# Patient Record
Sex: MALE | Race: BLACK OR AFRICAN AMERICAN | Employment: FULL TIME | URBAN - METROPOLITAN AREA
[De-identification: names, ages, dates, MRNs, and addresses within clinical notes are randomized per-mention and may not be internally consistent; named-entity substitution may affect disease eponyms.]

---

## 2019-05-13 ENCOUNTER — APPOINTMENT (OUTPATIENT)
Dept: CT IMAGING | Age: 39
End: 2019-05-13
Payer: MEDICAID

## 2019-05-13 ENCOUNTER — APPOINTMENT (OUTPATIENT)
Dept: GENERAL RADIOLOGY | Age: 39
End: 2019-05-13
Payer: MEDICAID

## 2019-05-13 ENCOUNTER — HOSPITAL ENCOUNTER (EMERGENCY)
Age: 39
Discharge: HOME | End: 2019-05-13
Attending: EMERGENCY MEDICINE
Payer: MEDICAID

## 2019-05-13 VITALS
HEART RATE: 59 BPM | SYSTOLIC BLOOD PRESSURE: 140 MMHG | RESPIRATION RATE: 14 BRPM | BODY MASS INDEX: 25.6 KG/M2 | HEIGHT: 72 IN | WEIGHT: 189 LBS | DIASTOLIC BLOOD PRESSURE: 88 MMHG | OXYGEN SATURATION: 98 % | TEMPERATURE: 98.7 F

## 2019-05-13 DIAGNOSIS — S52.601A CLOSED FRACTURE OF DISTAL ENDS OF RIGHT RADIUS AND ULNA, INITIAL ENCOUNTER: ICD-10-CM

## 2019-05-13 DIAGNOSIS — R91.1 PULMONARY NODULE: Primary | ICD-10-CM

## 2019-05-13 DIAGNOSIS — S52.501A CLOSED FRACTURE OF DISTAL ENDS OF RIGHT RADIUS AND ULNA, INITIAL ENCOUNTER: ICD-10-CM

## 2019-05-13 LAB
ABSOLUTE EOS #: 0.16 K/UL (ref 0–0.44)
ABSOLUTE IMMATURE GRANULOCYTE: <0.03 K/UL (ref 0–0.3)
ABSOLUTE LYMPH #: 2.09 K/UL (ref 1.1–3.7)
ABSOLUTE MONO #: 0.28 K/UL (ref 0.1–1.2)
ALBUMIN SERPL-MCNC: 4.6 G/DL (ref 3.5–5.2)
ALBUMIN/GLOBULIN RATIO: 1.4 (ref 1–2.5)
ALP BLD-CCNC: 65 U/L (ref 40–129)
ALT SERPL-CCNC: 13 U/L (ref 5–41)
ANION GAP SERPL CALCULATED.3IONS-SCNC: 11 MMOL/L (ref 9–17)
AST SERPL-CCNC: 18 U/L
BASOPHILS # BLD: 1 % (ref 0–2)
BASOPHILS ABSOLUTE: 0.04 K/UL (ref 0–0.2)
BILIRUB SERPL-MCNC: 0.34 MG/DL (ref 0.3–1.2)
BUN BLDV-MCNC: 18 MG/DL (ref 6–20)
BUN/CREAT BLD: ABNORMAL (ref 9–20)
CALCIUM SERPL-MCNC: 9.2 MG/DL (ref 8.6–10.4)
CHLORIDE BLD-SCNC: 104 MMOL/L (ref 98–107)
CO2: 23 MMOL/L (ref 20–31)
CREAT SERPL-MCNC: 1.21 MG/DL (ref 0.7–1.2)
DIFFERENTIAL TYPE: ABNORMAL
EOSINOPHILS RELATIVE PERCENT: 4 % (ref 1–4)
GFR AFRICAN AMERICAN: >60 ML/MIN
GFR NON-AFRICAN AMERICAN: >60 ML/MIN
GFR SERPL CREATININE-BSD FRML MDRD: ABNORMAL ML/MIN/{1.73_M2}
GFR SERPL CREATININE-BSD FRML MDRD: ABNORMAL ML/MIN/{1.73_M2}
GLUCOSE BLD-MCNC: 95 MG/DL (ref 70–99)
HCT VFR BLD CALC: 41.5 % (ref 40.7–50.3)
HEMOGLOBIN: 12.6 G/DL (ref 13–17)
IMMATURE GRANULOCYTES: 0 %
INR BLD: 1
LYMPHOCYTES # BLD: 46 % (ref 24–43)
MCH RBC QN AUTO: 26.4 PG (ref 25.2–33.5)
MCHC RBC AUTO-ENTMCNC: 30.4 G/DL (ref 28.4–34.8)
MCV RBC AUTO: 86.8 FL (ref 82.6–102.9)
MONOCYTES # BLD: 6 % (ref 3–12)
NRBC AUTOMATED: 0 PER 100 WBC
PARTIAL THROMBOPLASTIN TIME: 24.7 SEC (ref 20.5–30.5)
PDW BLD-RTO: 15.1 % (ref 11.8–14.4)
PLATELET # BLD: 205 K/UL (ref 138–453)
PLATELET ESTIMATE: ABNORMAL
PMV BLD AUTO: 12.4 FL (ref 8.1–13.5)
POTASSIUM SERPL-SCNC: 3.4 MMOL/L (ref 3.7–5.3)
PROTHROMBIN TIME: 10.7 SEC (ref 9–12)
RBC # BLD: 4.78 M/UL (ref 4.21–5.77)
RBC # BLD: ABNORMAL 10*6/UL
SEG NEUTROPHILS: 43 % (ref 36–65)
SEGMENTED NEUTROPHILS ABSOLUTE COUNT: 1.92 K/UL (ref 1.5–8.1)
SODIUM BLD-SCNC: 138 MMOL/L (ref 135–144)
TOTAL PROTEIN: 7.8 G/DL (ref 6.4–8.3)
WBC # BLD: 4.5 K/UL (ref 3.5–11.3)
WBC # BLD: ABNORMAL 10*3/UL

## 2019-05-13 PROCEDURE — 72128 CT CHEST SPINE W/O DYE: CPT

## 2019-05-13 PROCEDURE — 72170 X-RAY EXAM OF PELVIS: CPT

## 2019-05-13 PROCEDURE — 96374 THER/PROPH/DIAG INJ IV PUSH: CPT

## 2019-05-13 PROCEDURE — 73100 X-RAY EXAM OF WRIST: CPT

## 2019-05-13 PROCEDURE — 6360000004 HC RX CONTRAST MEDICATION: Performed by: EMERGENCY MEDICINE

## 2019-05-13 PROCEDURE — 71260 CT THORAX DX C+: CPT

## 2019-05-13 PROCEDURE — 2500000003 HC RX 250 WO HCPCS: Performed by: STUDENT IN AN ORGANIZED HEALTH CARE EDUCATION/TRAINING PROGRAM

## 2019-05-13 PROCEDURE — 72125 CT NECK SPINE W/O DYE: CPT

## 2019-05-13 PROCEDURE — 73562 X-RAY EXAM OF KNEE 3: CPT

## 2019-05-13 PROCEDURE — 73110 X-RAY EXAM OF WRIST: CPT

## 2019-05-13 PROCEDURE — 70450 CT HEAD/BRAIN W/O DYE: CPT

## 2019-05-13 PROCEDURE — 85730 THROMBOPLASTIN TIME PARTIAL: CPT

## 2019-05-13 PROCEDURE — 6370000000 HC RX 637 (ALT 250 FOR IP): Performed by: EMERGENCY MEDICINE

## 2019-05-13 PROCEDURE — 73130 X-RAY EXAM OF HAND: CPT

## 2019-05-13 PROCEDURE — 99285 EMERGENCY DEPT VISIT HI MDM: CPT

## 2019-05-13 PROCEDURE — 85610 PROTHROMBIN TIME: CPT

## 2019-05-13 PROCEDURE — 73120 X-RAY EXAM OF HAND: CPT

## 2019-05-13 PROCEDURE — 85025 COMPLETE CBC W/AUTO DIFF WBC: CPT

## 2019-05-13 PROCEDURE — 73090 X-RAY EXAM OF FOREARM: CPT

## 2019-05-13 PROCEDURE — 80053 COMPREHEN METABOLIC PANEL: CPT

## 2019-05-13 PROCEDURE — 6360000002 HC RX W HCPCS: Performed by: EMERGENCY MEDICINE

## 2019-05-13 PROCEDURE — 25605 CLTX DST RDL FX/EPHYS SEP W/: CPT

## 2019-05-13 PROCEDURE — 72131 CT LUMBAR SPINE W/O DYE: CPT

## 2019-05-13 RX ORDER — LIDOCAINE HYDROCHLORIDE 10 MG/ML
20 INJECTION, SOLUTION INFILTRATION; PERINEURAL ONCE
Status: COMPLETED | OUTPATIENT
Start: 2019-05-13 | End: 2019-05-13

## 2019-05-13 RX ORDER — FENTANYL CITRATE 50 UG/ML
50 INJECTION, SOLUTION INTRAMUSCULAR; INTRAVENOUS ONCE
Status: COMPLETED | OUTPATIENT
Start: 2019-05-13 | End: 2019-05-13

## 2019-05-13 RX ORDER — OXYCODONE HYDROCHLORIDE AND ACETAMINOPHEN 5; 325 MG/1; MG/1
1 TABLET ORAL EVERY 8 HOURS PRN
Qty: 20 TABLET | Refills: 0 | Status: SHIPPED | OUTPATIENT
Start: 2019-05-13 | End: 2019-05-20

## 2019-05-13 RX ORDER — OXYCODONE HYDROCHLORIDE AND ACETAMINOPHEN 5; 325 MG/1; MG/1
2 TABLET ORAL ONCE
Status: COMPLETED | OUTPATIENT
Start: 2019-05-13 | End: 2019-05-13

## 2019-05-13 RX ORDER — NAPROXEN 500 MG/1
500 TABLET ORAL 2 TIMES DAILY WITH MEALS
Qty: 60 TABLET | Refills: 0 | Status: SHIPPED | OUTPATIENT
Start: 2019-05-13 | End: 2019-05-28

## 2019-05-13 RX ADMIN — FENTANYL CITRATE 50 MCG: 50 INJECTION, SOLUTION INTRAMUSCULAR; INTRAVENOUS at 18:07

## 2019-05-13 RX ADMIN — OXYCODONE HYDROCHLORIDE AND ACETAMINOPHEN 2 TABLET: 5; 325 TABLET ORAL at 20:02

## 2019-05-13 RX ADMIN — LIDOCAINE HYDROCHLORIDE 20 ML: 10 INJECTION, SOLUTION INFILTRATION; PERINEURAL at 20:03

## 2019-05-13 RX ADMIN — IOHEXOL 75 ML: 350 INJECTION, SOLUTION INTRAVENOUS at 18:57

## 2019-05-13 ASSESSMENT — PAIN SCALES - GENERAL
PAINLEVEL_OUTOF10: 10
PAINLEVEL_OUTOF10: 3
PAINLEVEL_OUTOF10: 10

## 2019-05-13 ASSESSMENT — ENCOUNTER SYMPTOMS
SHORTNESS OF BREATH: 0
ABDOMINAL PAIN: 1
BACK PAIN: 0
CHEST TIGHTNESS: 0
DIARRHEA: 0
COLOR CHANGE: 0
VOMITING: 0
NAUSEA: 0

## 2019-05-13 ASSESSMENT — PAIN DESCRIPTION - PAIN TYPE: TYPE: ACUTE PAIN

## 2019-05-13 ASSESSMENT — PAIN DESCRIPTION - PROGRESSION: CLINICAL_PROGRESSION: NOT CHANGED

## 2019-05-13 NOTE — CONSULTS
Juan Horn      CC/Reason for consult:  Fall off scaffolding 12' - right wrist pain     HPI:      The patient is a LHD 42 y.o. male who presents to the ED after sustaining a fall of scaffolding. Patient states that he was about 15' in the air and the scaffolding came loose causing him to loose his balance and fall to the ground. He fell onto an outstretched right arm. Patient admits he does not recall the entire even and believes he had struck his head. He is complaining of acute pain to the right wrist as well as right knee pain. He admits to prior wrist injury many years ago which he states was treated in a cast however he did not have any follow up. Patient denies any prior surgeries. Patient denies any numbness or tingling but notes some dysesthesias to the tip of the distal phalanx of the thumb. Past Medical History:    History reviewed. No pertinent past medical history. Past Surgical History:    History reviewed. No pertinent surgical history. Medications Prior to Admission:   Prior to Admission medications    Not on File       Allergies:    Patient has no known allergies.     Social History:   Social History     Socioeconomic History    Marital status: None     Spouse name: None    Number of children: None    Years of education: None    Highest education level: None   Occupational History    None   Social Needs    Financial resource strain: None    Food insecurity:     Worry: None     Inability: None    Transportation needs:     Medical: None     Non-medical: None   Tobacco Use    Smoking status: Never Smoker    Smokeless tobacco: Never Used   Substance and Sexual Activity    Alcohol use: Yes     Comment: socially    Drug use: Yes     Types: Marijuana    Sexual activity: None   Lifestyle    Physical activity:     Days per week: None     Minutes per session: None    Stress: None   Relationships    Social connections:     Talks on phone: None Gets together: None     Attends Confucianist service: None     Active member of club or organization: None     Attends meetings of clubs or organizations: None     Relationship status: None    Intimate partner violence:     Fear of current or ex partner: None     Emotionally abused: None     Physically abused: None     Forced sexual activity: None   Other Topics Concern    None   Social History Narrative    None       Family History:  History reviewed. No pertinent family history. REVIEW OF SYSTEMS:   Review of Systems   Constitutional: Negative for fever and chills. HENT: Negative for congestion. Eyes: Negative for blurred vision and double vision. Respiratory: Negative for cough, shortness of breath and wheezing. Cardiovascular: Negative for chest pain and palpitations. Gastrointestinal: Negative for nausea. Negative for vomiting. Musculoskeletal: Positive for right wrist pain  Skin: Negative for itching and rash. Neurological: Negative for dizziness, sensory change and headaches. Psychiatric/Behavioral: Negative for depression and suicidal ideas. PHYSICAL EXAM:  Blood pressure (!) 132/93, pulse 62, temperature 97.8 °F (36.6 °C), temperature source Oral, resp. rate 16, height 6' (1.829 m), weight 189 lb (85.7 kg), SpO2 95 %. Gen: alert and oriented, NAD, cooperative  Head: normocephalic atraumatic   Neck: supple  Chest: Symmetric chest excursion, non labored breathing. B/l clavicles without TTP, crepitus, step off, or deformity  Heart: Regular rate, no edema, distal pulses 2+   Abdomen: non distended  Pelvis: stable to anterior and lateral compression   RUE: No ecchymoses, abrasion,  erythema or lacerations. Deformity noted to the right wrist. Skin intact. No TTP or crepitus to shoulder, arm, elbow, or hand. TTP over the distal aspect of radius and ulna. Full active and passive ROM shoulder and elbow met without pain. Compartments soft and compressible. Hand is warm and perfused. Axillary/MSC/Ulnar/Median/AIN/PIN motor intact. C4-T1 SILT. Radial pulse 2+ with BCR. Mild dysesthesias to the tip of the thumb distal to the IP joint. LUE: No ecchymoses, abrasion, deformity, erythema or lacerations. Skin intact. No TTP or crepitus to shoulder, arm, elbow, forearm, wrist, or hand. Full active and passive ROM met without pain. Compartments soft and compressible. Hand is warm and perfused. Axillary/MSC/Ulnar/Median/AIN/PIN motor intact. C4-T1 SILT. Radial pulse 2+ with BCR  RLE: No ecchymoses, abrasions, deformity, erythema or lacerations. Skin intact. No TTP or crepitus to hip, tibia/fibula, ankle or foot. Mild TTP to the lateral aspect of knee/distal femur. Full active and passive ROM met with minimal pain. Negative log roll. Negative Stinchfield. Compartments soft and compressible. Foot is warm and perfused. EHL/FHL/TA/GS complex motor intact. Sural, saphenous, superificial/deep peroneal, and plantar nerve distribution SILT. Dorsalis pedis/posterior tibial pulses 2+ with BCR. Knee ligaments grossly intact. LLE: No ecchymoses, abrasions, deformity, erythema or lacerations. Skin intact. No TTP or crepitus to hip, femur, knee, tibia/fibula, ankle or foot. Full active and passive ROM met without pain. Negative log roll. Negative Stinchfield. Compartments soft and compressible. Foot is warm and perfused. EHL/FHL/TA/GS complex motor intact. Sural, saphenous, superificial/deep peroneal, and plantar nerve distribution SILT. Dorsalis pedis/posterior tibial pulses 2+ with BCR. Knee ligaments grossly intact. LABS:  Recent Labs     05/13/19  1805   WBC 4.5   HGB 12.6*   HCT 41.5      INR 1.0      K 3.4*   BUN 18   CREATININE 1.21*   GLUCOSE 95        Radiology:   AP/Lateral/Oblique of a right wrist demonstrating a comminuted impacted distal radius fracture with significant loss of radial height and inclination. Chronic scaphoid fracture with nonunion noted.   Degenerative changes throughout the midcarpal joint     A/P: 42 y.o. male being seen for right distal radius fracture    -Closed reduction and sugar tong splint application under hematoma block performed in ED. Patient tolerated well. See procedure note for details.  - ZARI TSAI  - Pain control and medical management ED  - Maintain splint at all times. - discussed proper splint care with the patient. - Discussed signs and symptoms of compartment syndrome. - Ice (20 minutes on and off 1 hour) and elevate (above heart) as needed for swelling/pain  - Will discuss case with Dr. Kevin Márquez to [place] from orthopedic perspective. Follow up with Dr. Kolton Higginbotham in 1 week. - Please page DO Ortho with any questions or concerns    Ac Keen DO  7:12 PM 5/13/2019    Hematoma block: Risks, benefits, and alternatives were discussed with the patient, and verbal consent was obtained for hematoma block. 10cc of 1% plain lidocaine was injected into the right distal radius fracture. Once adequate pain control had been achieved, reduction maneuver was performed and fragment position was confirmed on X-ray. A sugar tong splint was applied, and fragment position was again confirmed on X-ray. The patient noticed decrease in pain and denied numbness or tingling in his fingers.

## 2019-05-13 NOTE — ED NOTES
Initial contact with patient;  Pt returned from [address] [place] [address]  05/13/19 4709

## 2019-05-13 NOTE — ED NOTES
Bed: 30  Expected date:   Expected time:   Means of arrival:   Comments:  [id number], PETER  05/13/19 9934

## 2019-05-13 NOTE — H&P
TRAUMA HISTORY AND PHYSICAL EXAMINATION    PATIENT NAME: Joycelyn Lopez  YOB: 1980  MEDICAL RECORD NO. [id number]   DATE: 5/13/2019  PRIMARY CARE PHYSICIAN: No primary care provider on file. PATIENT EVALUATED AT THE REQUEST OF : Trevor Lopez    ACTIVATION   []Trauma Alert     [] Trauma Priority     [x]Trauma Consult. IMPRESSION:      comminuted impacted intraarticular fx distal radius with displaced fx ulnar styloid     MEDICAL DECISION MAKING AND PLAN:     Right two bone fx resulting in significant distracting injury therefore will perform a CT head c/t/l chest abd pelvis. Recommned pain control and reduction of fx. [address]    [] Neurosurgery     [x] Orthopedic Surgery    [] Cardiothoracic     [] Facial Trauma    [] Plastic Surgery (Burn)    [] Pediatric Surgery     [] Internal Medicine     [] Pulmonary Medicine    [] Other:        HISTORY:     SOURCE OF INFORMATION  Patient information was obtained from patient. History/Exam limitations: none. INJURY SUMMARY  Extremity -  fracture; closed and radius Right, ulna Right    GENERAL DATA  Age 42 y.o.  male   Patient information was obtained from patient. History/Exam limitations: none.   Patient presented to the Emergency Department by ambulance where the patient received see Ambulance Run Sheet prior to arrival.  Injury Date: 5/13/2019   Approximate Injury Time: 17:00        Transport mode:   [x]Ambulance      [] Helicopter     []Car       [] Other  Referring Hospital:     [address], (e.g., home, farm, industry, street)  Specific Details of Location (e.g., bedroom, kitchen, garage): work  Type of Residence (if occurred in home setting) (e.g., apartment, mobile home, single family home): unknown     MECHANISM OF INJURY    [] Motor Vehicle Collision   Specific vehicle type involved (e.g., sedan, minivan, SUV, pickup truck):   Collision with (e.g., type of vehicle, building, barn, ditch, tree):     Type of collision  [] Single Vehicle Collision  []Multiple Vehicle Collision  [] unknown collision type    Mechanism considerations  [] Fatality in Same Vehicle      []Ejected       []Rollover          []Extricated    Internal Compartment   []                      []Passenger:      []Front Seat        []Rear [address]  [] Unrestrained   []Lap Belt Only Restrained   [] Shoulder Belt Only Restrained  [] 3 Point Restrained  [] unknown     Air Bags  [] Front Air Bag  []Side Air Bag  []Curtain Airbag [][employer] Not Deployed        Pediatric Consideration:      [] Booster Seat  []Infant Car Seat  [] Child Car Seat      [] Motorcycle Collision   Wearing Helmet     []Yes     []No    []Unknown    [] Bicycle Collision Wearing Helmet     []Yes     []No    []Unknown     [] Pedestrian Struck         [x] Fall    []From Standing     []From Height 12-16 Ft     []Down Stairs ___steps    [] Assault    [] Gunshot  Specify caliber / type of gun: ____________________________    [] [address] weapon type, size: _____________________________    [] Burn  []Flame   []Scald   []Electrical   []Chemical  []Inhalation   []House fire    [] Other ______________________________________________________    [] Other protective devices used / worn ___________________________    HISTORY:     Aissatou Owusu is a 42 y.o. male that presented to the Emergency Department following fall off scaffolding ladder of approximately 12 - 16 ft. Ladder was not opened all the way and became wobbly. Patient fell to right side landing on right forearm. He \"blacked out\" for a few seconds and it took him a little to catch his breath and standup. He was ambulatory but had immediate right forearm and left flank pain. Denies n/v, vision / taste / hearing  Changes or altered sensorium. No dysthesia. No difficulty swallowing, speaking, or breathing. No headache, neck pain, or back pain. No leg or foot pain. No Chest pain or seizure like activity. No AC. Not amnestic. Loss of Consciousness []No   []Yes Duration(min)       [x] Unknown     Total Fluids Given Prior To Arrival  cc    MEDICATIONS:   []  None     []  Information not available due to exam limitations documented above  Prior to Admission medications    Medication Sig Start Date End Date Taking? Authorizing Provider   oxyCODONE-acetaminophen (PERCOCET) 5-325 MG per tablet Take 1 tablet by mouth every 8 hours as needed for Pain for up to 7 days. Intended supply: 5 days. Take lowest dose possible to manage pain 5/13/19 5/20/19 Yes Gerber White MD   naproxen (NAPROSYN) 500 MG tablet Take 1 tablet by mouth 2 times daily (with meals) for 30 doses 5/13/19 5/28/19 Yes Gerber White MD       ALLERGIES:   []  None    []   Information not available due to exam limitations documented above   Patient has no known allergies. PAST MEDICAL HISTORY: []  None   []   Information not available due to exam limitations documented above    has no past medical history on file. has no past surgical history on file. FAMILY HISTORY   []   Information not available due to exam limitations documented above    family history is not on file. SOCIAL HISTORY  []   Information not available due to exam limitations documented above     reports that he has never smoked. He has never used smokeless tobacco.   reports that he drinks alcohol. reports that he has current or past drug history. Drug: Marijuana. PERTINENT SYSTEMIC REVIEW:    []   Information not available due to exam limitations documented above    Pertinent items are noted in HPI.     PHYSICAL EXAMINATION:     GLASCOW COMA SCALE  NEUROMUSCULAR BLOCKADE PRIOR TO ARRIVAL     [x]No        []Yes      Variable  Score   Variable  Score  Eye opening [x]Spontaneous 4 Verbal  [x]Oriented  5     []To voice  3   []Confused  4    []To pain  2   []Inapp words  3    []None  1   []Incomp words 2       []None  1   Motor   [x]Obeys  6    []Localizes the distal radius with a mildly displaced fracture of the ulnar styloid. Pelvic x-ray: No acute fracture or hip dislocation is identified. Acute fracture of the right wrist. Additional incidental findings as detailed above. Xr Wrist Right (2 Views)    Result Date: 5/13/2019  EXAMINATION: 2 XRAY VIEWS OF THE RIGHT WRIST 5/13/2019 8:24 pm COMPARISON: None. HISTORY: ORDERING SYSTEM PROVIDED HISTORY: Post-Reduction TECHNOLOGIST PROVIDED HISTORY: Post-Reduction Ordering Physician Provided Reason for Exam: post reduction right wrist Acuity: Acute Type of Exam: Subsequent/Follow-up FINDINGS: Comminuted intra-articular impacted fracture distal radius. Avulsion fracture ulnar styloid. Cystic changes within the proximal row of carpal bones. Alignment anatomic. Soft tissue swelling. Subcutaneous gas possibly dorsally. Fracture distal radius and ulna     Xr Wrist Right (min 3 Views)    Result Date: 5/13/2019  EXAMINATION: 3 X-RAY VIEWS OF THE RIGHT WRIST; 3 XRAY VIEWS OF THE RIGHT KNEE 5/13/2019 8:26 pm; 5/13/2019 8:28 pm COMPARISON: Right wrist radiographs May 13, 2019 20:03 HISTORY: ORDERING SYSTEM PROVIDED HISTORY: Post-Splint TECHNOLOGIST PROVIDED HISTORY: Post-Splint Ordering Physician Provided Reason for Exam: Post casting hx of fall Acuity: Acute Type of Exam: Ongoing; ORDERING SYSTEM PROVIDED HISTORY: Fall TECHNOLOGIST PROVIDED HISTORY: include sunrise view Fall Ordering Physician Provided Reason for Exam: Hx of fall right knee pain Acuity: Acute Type of Exam: Ongoing FINDINGS: Right wrist: Interval reduction with overlying splinting material surrounding bony detail. Comminuted distal radius fracture fragments are in improved alignment. No change in the ulnar styloid fracture. Right wrist degenerative changes. Right knee: No acute fracture. Joint spaces are preserved. No joint effusion. Right wrist reduction with improved alignment of fracture fragments.  No acute findings involving the right knee.     Xr Wrist Right (min 3 Views)    Result Date: 5/13/2019  EXAMINATION: 3 XRAY VIEWS OF THE RIGHT WRIST 5/13/2019 6:03 pm COMPARISON: None. HISTORY: ORDERING SYSTEM PROVIDED HISTORY: Concern for fracture TECHNOLOGIST PROVIDED HISTORY: Concern for fracture Ordering Physician Provided Reason for Exam: fell off ladder Acuity: Acute Type of Exam: Initial FINDINGS: There is a comminuted, impacted intra-articular fracture of the distal radius. There is also a mildly displaced fracture of the ulnar styloid. A scaphoid waist fracture is present, possibly chronic with nonunion of fracture fragments. There are nonspecific periarticular erosions of the proximal carpal row. Acute fracture of the wrist. Age-indeterminate but likely chronic fracture of the scaphoid. Nonspecific periarticular erosions of the proximal carpal row. Joint aspiration may be considered to rule out the possibility of infection. Xr Hand Right (min 3 Views)    Result Date: 5/13/2019  EXAMINATION: 3 XRAY VIEWS OF THE RIGHT HAND; AP AND LATERAL XRAY VIEWS OF THE RIGHT FOREARM; ONE XRAY VIEW OF THE PELVIS 5/13/2019 6:03 pm COMPARISON: None. HISTORY: ORDERING SYSTEM PROVIDED HISTORY: Concern for fracture TECHNOLOGIST PROVIDED HISTORY: Concern for fracture Ordering Physician Provided Reason for Exam: 2 Acuity: Acute Type of Exam: Initial; ORDERING SYSTEM PROVIDED HISTORY: Concern for fracture TECHNOLOGIST PROVIDED HISTORY: Concern for fracture Ordering Physician Provided Reason for Exam: pt fell Acuity: Acute Type of Exam: Initial; ORDERING SYSTEM PROVIDED HISTORY: Fall. TECHNOLOGIST PROVIDED HISTORY: Fall. Ordering Physician Provided Reason for Exam: pt fell onto left side Acuity: Acute Type of Exam: Initial FINDINGS: Right hand: There is a chronic appearing scaphoid waist fracture with nonunion of fracture fragments. There are nonspecific periarticular erosions of the proximal carpal row. An acute fracture of the wrist is present.  There may also be a chip fracture of the lunate. Right forearm: No fracture of the proximal forearm is identified. There is a comminuted, impacted intra-articular fracture of the distal radius with a mildly displaced fracture of the ulnar styloid. Pelvic x-ray: No acute fracture or hip dislocation is identified. Acute fracture of the right wrist. Additional incidental findings as detailed above. Xr Knee Right (3 Views)    Result Date: 5/13/2019  EXAMINATION: 3 X-RAY VIEWS OF THE RIGHT WRIST; 3 XRAY VIEWS OF THE RIGHT KNEE 5/13/2019 8:26 pm; 5/13/2019 8:28 pm COMPARISON: Right wrist radiographs May 13, 2019 20:03 HISTORY: ORDERING SYSTEM PROVIDED HISTORY: Post-Splint TECHNOLOGIST PROVIDED HISTORY: Post-Splint Ordering Physician Provided Reason for Exam: Post casting hx of fall Acuity: Acute Type of Exam: Ongoing; ORDERING SYSTEM PROVIDED HISTORY: Fall TECHNOLOGIST PROVIDED HISTORY: include sunrise view Fall Ordering Physician Provided Reason for Exam: Hx of fall right knee pain Acuity: Acute Type of Exam: Ongoing FINDINGS: Right wrist: Interval reduction with overlying splinting material surrounding bony detail. Comminuted distal radius fracture fragments are in improved alignment. No change in the ulnar styloid fracture. Right wrist degenerative changes. Right knee: No acute fracture. Joint spaces are preserved. No joint effusion. Right wrist reduction with improved alignment of fracture fragments. No acute findings involving the right knee. Ct Head Wo Contrast    Result Date: 5/13/2019  EXAMINATION: CT OF THE HEAD WITHOUT CONTRAST  5/13/2019 5:59 pm TECHNIQUE: CT of the head was performed without the administration of intravenous contrast. Dose modulation, iterative reconstruction, and/or weight based adjustment of the mA/kV was utilized to reduce the radiation dose to as low as reasonably achievable. COMPARISON: None.  HISTORY: ORDERING SYSTEM PROVIDED HISTORY: + LOC, fall down 10-12 feet TECHNOLOGIST PROVIDED HISTORY: FINDINGS: BRAIN/VENTRICLES: There is no acute intracranial hemorrhage, mass effect or midline shift. No abnormal extra-axial fluid collection. The gray-white differentiation is maintained without evidence of an acute infarct. There is no evidence of hydrocephalus. ORBITS: The visualized portion of the orbits demonstrate no acute abnormality. SINUSES: The visualized paranasal sinuses and mastoid air cells demonstrate no acute abnormality. SOFT TISSUES/SKULL:  No acute abnormality of the visualized skull or soft tissues. No acute intracranial abnormality. Ct Cervical Spine Wo Contrast    Result Date: 5/13/2019  EXAMINATION: CT OF THE CERVICAL SPINE WITHOUT CONTRAST 5/13/2019 5:59 pm TECHNIQUE: CT of the cervical spine was performed without the administration of intravenous contrast. Multiplanar reformatted images are provided for review. Dose modulation, iterative reconstruction, and/or weight based adjustment of the mA/kV was utilized to reduce the radiation dose to as low as reasonably achievable. COMPARISON: None. HISTORY: ORDERING SYSTEM PROVIDED HISTORY: fall from scaffolding. TECHNOLOGIST PROVIDED HISTORY: Ordering Physician Provided Reason for Exam: fall Acuity: Acute Type of Exam: Initial FINDINGS: BONES/ALIGNMENT: There is no evidence of an acute cervical spine fracture. There is normal alignment of the cervical spine. DEGENERATIVE CHANGES: No significant degenerative changes. SOFT TISSUES: There is no prevertebral soft tissue swelling. No acute abnormality of the cervical spine. Ct Thoracic Spine Wo Contrast    Result Date: 5/13/2019  EXAMINATION: CT OF THE THORACIC SPINE WITHOUT CONTRAST; CT OF THE LUMBAR SPINE WITHOUT CONTRAST 5/13/2019 TECHNIQUE: CT of the thoracic spine was performed without the administration of intravenous contrast. Multiplanar reformatted images are provided for review.  Dose modulation, iterative reconstruction, and/or weight based adjustment of the mA/kV was utilized to reduce the radiation dose to as low as reasonably achievable.; CT of the lumbar spine was performed without the administration of intravenous contrast. Multiplanar reformatted images are provided for review. Dose modulation, iterative reconstruction, and/or weight based adjustment of the mA/kV was utilized to reduce the radiation dose to as low as reasonably achievable. COMPARISON: None. HISTORY: ORDERING SYSTEM PROVIDED HISTORY: fall > 12 ft TECHNOLOGIST PROVIDED HISTORY: Ordering Physician Provided Reason for Exam: fall Acuity: Acute Type of Exam: Initial; ORDERING SYSTEM PROVIDED HISTORY: fall TECHNOLOGIST PROVIDED HISTORY: fall FINDINGS: BONES/ALIGNMENT:  There is no gross evidence of an acute fracture of the thoracic or lumbar spine. There is normal alignment of the spine. DEGENERATIVE CHANGES: No significant degenerative changes of the thoracic or lumbar spine. SOFT TISSUES: No paraspinal mass is seen. No evidence of acute traumatic injury of the thoracic or lumbar spine. Ct Lumbar Spine Wo Contrast    Result Date: 5/13/2019  EXAMINATION: CT OF THE THORACIC SPINE WITHOUT CONTRAST; CT OF THE LUMBAR SPINE WITHOUT CONTRAST 5/13/2019 TECHNIQUE: CT of the thoracic spine was performed without the administration of intravenous contrast. Multiplanar reformatted images are provided for review. Dose modulation, iterative reconstruction, and/or weight based adjustment of the mA/kV was utilized to reduce the radiation dose to as low as reasonably achievable.; CT of the lumbar spine was performed without the administration of intravenous contrast. Multiplanar reformatted images are provided for review. Dose modulation, iterative reconstruction, and/or weight based adjustment of the mA/kV was utilized to reduce the radiation dose to as low as reasonably achievable. COMPARISON: None.  HISTORY: ORDERING SYSTEM PROVIDED HISTORY: fall > 12 ft TECHNOLOGIST PROVIDED HISTORY: Ordering Physician Provided Reason for Exam: fall Acuity: Acute Type of Exam: Initial; ORDERING SYSTEM PROVIDED HISTORY: fall TECHNOLOGIST PROVIDED HISTORY: fall FINDINGS: BONES/ALIGNMENT:  There is no gross evidence of an acute fracture of the thoracic or lumbar spine. There is normal alignment of the spine. DEGENERATIVE CHANGES: No significant degenerative changes of the thoracic or lumbar spine. SOFT TISSUES: No paraspinal mass is seen. No evidence of acute traumatic injury of the thoracic or lumbar spine. Ct Chest Abdomen Pelvis W Contrast    Result Date: 5/13/2019  EXAMINATION: CT OF THE CHEST, ABDOMEN, AND PELVIS WITH CONTRAST 5/13/2019 6:25 pm TECHNIQUE: CT of the chest, abdomen and pelvis was performed with the administration of intravenous contrast. Multiplanar reformatted images are provided for review. Dose modulation, iterative reconstruction, and/or weight based adjustment of the mA/kV was utilized to reduce the radiation dose to as low as reasonably achievable. COMPARISON: None HISTORY: ORDERING SYSTEM PROVIDED HISTORY: fall > 12 feet TECHNOLOGIST PROVIDED HISTORY: FINDINGS: Chest: Mediastinum:  Thoracic aorta and central portion of the pulmonary artery opacify normally. The ascending thoracic aorta is of normal caliber. Heart size is normal. There is no pericardial effusion. No mediastinal or hilar lymph nodes exceed the CT criteria for abnormal enlargement. Lungs/pleura: 5 mm pulmonary nodule left lower lobe on image number 83. lungs are otherwise clear. Soft Tissues/Bones: Normal Abdomen/Pelvis: Organs: The abdominal wall appears normal. The liver, spleen, pancreas, and adrenals appear normal.  Gallbladder normal. Kidneys appear normal. The bladder appears normal. GI/Bowel: The stomach,small bowel, and colon appear normal. Appendix normal. Pelvis: Normal Peritoneum/Retroperitoneum: The abdominal aorta and iliac arteries are normal in caliber. There is no pathologic adenopathy.  Bones/Soft Tissues: Normal     No acute traumatic sequelae. 5 mm left lower lobe pulmonary nodule. Recommend follow-up CT chest in 3-6 months. Differential considerations include infectious, inflammatory, and neoplastic etiologies. RADIOLOGY  PLAIN FILMS  Ordered Findings  [x]CHEST [x]Normal   [] Trauma Abnormality -    [x]PELVIS  []Normal   [] Trauma Abnormality -   EXTREMITIES      [x]RUE []Normal   [] Trauma Abnormality -  Right chronic scaphoid fx, possible R lunate chip fx, comminuted impacted intraarticular fx distal radius with displaced fx ulnar styloid     []LUE  []Normal   [] Trauma Abnormality -     []RLE []Normal   [] Trauma Abnormaiity -     []LLE  []Normal   [] Trauma Abnormaiity -   []OTHER []Normal   [] Trauma Abnormaiity -     CT SCANS  Ordered  [x]HEAD  []Normal   [] Trauma Abnormality -    [x]CHEST  []Normal   [] Trauma Abnormality -   [x]ABD/PELVIS[]Normal  [] Trauma Abnormality -   []FACE []Normal   [] Trauma Abnormality -   [x]C-SPINE  []Normal   [] Trauma Abnormality -   [x]T-SPINE  []Normal   [] Trauma Abnormality -   [x]L-SPINE  []Normal   [] Trauma Abnormality -     []ANGIOGRAPHY  []Normal     [] Trauma Abnormality -   []OTHER   []Normal     [] Trauma Abnormality -     LABS    Labs Reviewed   CBC WITH AUTO DIFFERENTIAL - Abnormal; Notable for the following components:       Result Value    Hemoglobin 12.6 (*)     RDW 15.1 (*)     Lymphocytes 46 (*)     All other components within normal limits   COMPREHENSIVE METABOLIC PANEL - Abnormal; Notable for the following components:    CREATININE 1.21 (*)     Potassium 3.4 (*)     All other components within normal limits   PROTIME-INR   APTT         Fred Tavares DO  5/13/19, 9:19 PM                   Trauma Attending Attestation      I have reviewed the above [facility] note(s) and confirmed the key elements of the medical history and physical exam. I have seen and examined the pt.   I have discussed the findings, established the care plan and recommendations with Resident, [facility] RN, bedside nurse.   Concussion with brief loss of consciousness  Right radial/ ulnar fracture   Tertiary exam       Muna Muller DO  5/14/2019  6:41 AM

## 2019-05-13 NOTE — ED PROVIDER NOTES
Relationships    Social connections:     Talks on phone: Not on file     Gets together: Not on file     Attends Gnosticist service: Not on file     Active member of club or organization: Not on file     Attends meetings of clubs or organizations: Not on file     Relationship status: Not on file    Intimate partner violence:     Fear of current or ex partner: Not on file     Emotionally abused: Not on file     Physically abused: Not on file     Forced sexual activity: Not on file   Other Topics Concern    Not on file   Social History Narrative    Not on file       History reviewed. No pertinent family history. Allergies:  Patient has no known allergies. Home Medications:  Prior to Admission medications    Medication Sig Start Date End Date Taking? Authorizing Provider   oxyCODONE-acetaminophen (PERCOCET) 5-325 MG per tablet Take 1 tablet by mouth every 8 hours as needed for Pain for up to 7 days. Intended supply: 5 days. Take lowest dose possible to manage pain 5/13/19 5/20/19 Yes Melody Aponte MD   naproxen (NAPROSYN) 500 MG tablet Take 1 tablet by mouth 2 times daily (with meals) for 30 doses 5/13/19 5/28/19 Yes Melody Aponte MD       REVIEW OF SYSTEMS    (2-9 systems for level 4, 10 or more for level 5)      Review of Systems   Constitutional: Negative for chills and fever. Respiratory: Negative for chest tightness and shortness of breath. Cardiovascular: Negative for chest pain. Gastrointestinal: Positive for abdominal pain. Negative for diarrhea, nausea and vomiting. Genitourinary: Negative for dysuria. Musculoskeletal: Positive for arthralgias. Negative for back pain and neck pain. Skin: Negative for color change. Neurological: Negative for weakness, numbness and headaches. Psychiatric/Behavioral: Negative for confusion.        PHYSICAL EXAM   (up to 7 for level 4, 8 or more for level 5)      INITIAL VITALS:   BP (!) 140/88   Pulse 59   Temp 98.7 °F (37.1 °C) (Oral)   Resp 14 Ht 6' (1.829 m)   Wt 189 lb (85.7 kg)   SpO2 98%   BMI 25.63 kg/m²     Physical Exam   Constitutional: He is oriented to person, place, and time. He appears well-developed and well-nourished. No distress. HENT:   Head: Normocephalic and atraumatic. Eyes: Conjunctivae and EOM are normal. Right eye exhibits no discharge. Left eye exhibits no discharge. Neck: Normal range of motion. No midline cervical tenderness on examination. Cardiovascular: Normal rate, regular rhythm and normal heart sounds. Exam reveals no gallop and no friction rub. No murmur heard. Pulmonary/Chest: Effort normal and breath sounds normal. No respiratory distress. He has no wheezes. He has no rales. Abdominal: Soft. He exhibits no distension. There is tenderness. There is no rebound and no guarding. Tenderness over the right upper quadrant over the liver. Musculoskeletal: He exhibits tenderness. He exhibits no edema. Significant tenderness over the right distal radius and ulna, neurovascularly intact, normal sensation, able to wiggle fingers. Decreased range of motion at the wrist secondary to pain. Bilateral radial pulses intact. No open skin wounds. Neurological: He is alert and oriented to person, place, and time. Skin: Skin is warm and dry. No erythema. Vitals reviewed.     No midline CTL tenderness on exam.    DIFFERENTIAL  DIAGNOSIS     PLAN (LABS / IMAGING / EKG):  Orders Placed This Encounter   Procedures    CT Head WO Contrast    CT Cervical Spine WO Contrast    XR CHEST STANDARD (2 VW)    XR PELVIS (1-2 VIEWS)    XR WRIST RIGHT (MIN 3 VIEWS)    XR RADIUS ULNA RIGHT (2 VIEWS)    XR HAND RIGHT (MIN 3 VIEWS)    CT CHEST ABDOMEN PELVIS W CONTRAST    CT THORACIC SPINE WO CONTRAST    CT LUMBAR SPINE WO CONTRAST    XR KNEE RIGHT (3 VIEWS)    XR WRIST RIGHT (MIN 3 VIEWS)    XR WRIST RIGHT (2 VIEWS)    CBC WITH AUTO DIFFERENTIAL    Protime-INR    APTT    Comprehensive Metabolic Panel    Inpatient consult to Trauma Surgery    Inpatient consult to Orthopedic Surgery       MEDICATIONS ORDERED:  Orders Placed This Encounter   Medications    fentaNYL (SUBLIMAZE) injection 50 mcg    iohexol (OMNIPAQUE 350) solution 75 mL    DISCONTD: iohexol (OMNIPAQUE 350) solution 75 mL    lidocaine 1 % injection 20 mL    oxyCODONE-acetaminophen (PERCOCET) 5-325 MG per tablet 2 tablet    oxyCODONE-acetaminophen (PERCOCET) 5-325 MG per tablet     Sig: Take 1 tablet by mouth every 8 hours as needed for Pain for up to 7 days. Intended supply: 5 days.  Take lowest dose possible to manage pain     Dispense:  20 tablet     Refill:  0    naproxen (NAPROSYN) 500 MG tablet     Sig: Take 1 tablet by mouth 2 times daily (with meals) for 30 doses     Dispense:  60 tablet     Refill:  0       DDX: Radial fracture versus ulnar fracture versus intracranial hemorrhage    DIAGNOSTIC RESULTS / EMERGENCY DEPARTMENT COURSE / MDM     LABS:  Results for orders placed or performed during the hospital encounter of 05/13/19   CBC WITH AUTO DIFFERENTIAL   Result Value Ref Range    WBC 4.5 3.5 - 11.3 k/uL    RBC 4.78 4.21 - 5.77 m/uL    Hemoglobin 12.6 (L) 13.0 - 17.0 g/dL    Hematocrit 41.5 40.7 - 50.3 %    MCV 86.8 82.6 - 102.9 fL    MCH 26.4 25.2 - 33.5 pg    MCHC 30.4 28.4 - 34.8 g/dL    RDW 15.1 (H) 11.8 - 14.4 %    Platelets 944 991 - 127 k/uL    MPV 12.4 8.1 - 13.5 fL    NRBC Automated 0.0 0.0 per 100 WBC    Differential Type NOT REPORTED     Seg Neutrophils 43 36 - 65 %    Lymphocytes 46 (H) 24 - 43 %    Monocytes 6 3 - 12 %    Eosinophils % 4 1 - 4 %    Basophils 1 0 - 2 %    Immature Granulocytes 0 0 %    Segs Absolute 1.92 1.50 - 8.10 k/uL    Absolute Lymph # 2.09 1.10 - 3.70 k/uL    Absolute Mono # 0.28 0.10 - 1.20 k/uL    Absolute Eos # 0.16 0.00 - 0.44 k/uL    Basophils # 0.04 0.00 - 0.20 k/uL    Absolute Immature Granulocyte <0.03 0.00 - 0.30 k/uL    WBC Morphology NOT REPORTED     RBC Morphology ANISOCYTOSIS PRESENT Platelet Estimate NOT REPORTED    Protime-INR   Result Value Ref Range    Protime 10.7 9.0 - 12.0 sec    INR 1.0    APTT   Result Value Ref Range    PTT 24.7 20.5 - 30.5 sec   Comprehensive Metabolic Panel   Result Value Ref Range    Glucose 95 70 - 99 mg/dL    BUN 18 6 - 20 mg/dL    CREATININE 1.21 (H) 0.70 - 1.20 mg/dL    Bun/Cre Ratio NOT REPORTED 9 - 20    Calcium 9.2 8.6 - 10.4 mg/dL    Sodium 138 135 - 144 mmol/L    Potassium 3.4 (L) 3.7 - 5.3 mmol/L    Chloride 104 98 - 107 mmol/L    CO2 23 20 - 31 mmol/L    Anion Gap 11 9 - 17 mmol/L    Alkaline Phosphatase 65 40 - 129 U/L    ALT 13 5 - 41 U/L    AST 18 <40 U/L    Total Bilirubin 0.34 0.3 - 1.2 mg/dL    Total Protein 7.8 6.4 - 8.3 g/dL    Alb 4.6 3.5 - 5.2 g/dL    Albumin/Globulin Ratio 1.4 1.0 - 2.5    GFR Non-African American >60 >60 mL/min    GFR African American >60 >60 mL/min    GFR Comment          GFR Staging NOT REPORTED        IMPRESSION: 46-year-old male comes to the emergency department secondary to a fall, fall was 10-12 feet, positive loss of consciousness, positive head trauma, significant right-sided wrist pain, appears to have a fracture, trauma consult was done, significant right upper quadrant tenderness over the liver due to mechanism of more than 10 feet, a CT abdomen and pelvis was done as well. Trauma consult, orthopedic surgery consult. RADIOLOGY:    Xr Pelvis (1-2 Views)    Result Date: 5/13/2019  EXAMINATION: 3 XRAY VIEWS OF THE RIGHT HAND; AP AND LATERAL XRAY VIEWS OF THE RIGHT FOREARM; ONE XRAY VIEW OF THE PELVIS 5/13/2019 6:03 pm COMPARISON: None.  HISTORY: ORDERING SYSTEM PROVIDED HISTORY: Concern for fracture TECHNOLOGIST PROVIDED HISTORY: Concern for fracture Ordering Physician Provided Reason for Exam: 2 Acuity: Acute Type of Exam: Initial; ORDERING SYSTEM PROVIDED HISTORY: Concern for fracture TECHNOLOGIST PROVIDED HISTORY: Concern for fracture Ordering Physician Provided Reason for Exam: pt fell Acuity: Acute Type of Exam: Initial; ORDERING SYSTEM PROVIDED HISTORY: Fall. TECHNOLOGIST PROVIDED HISTORY: Fall. Ordering Physician Provided Reason for Exam: pt fell onto left side Acuity: Acute Type of Exam: Initial FINDINGS: Right hand: There is a chronic appearing scaphoid waist fracture with nonunion of fracture fragments. There are nonspecific periarticular erosions of the proximal carpal row. An acute fracture of the wrist is present. There may also be a chip fracture of the lunate. Right forearm: No fracture of the proximal forearm is identified. There is a comminuted, impacted intra-articular fracture of the distal radius with a mildly displaced fracture of the ulnar styloid. Pelvic x-ray: No acute fracture or hip dislocation is identified. Acute fracture of the right wrist. Additional incidental findings as detailed above. Xr Radius Ulna Right (2 Views)    Result Date: 5/13/2019  EXAMINATION: 3 XRAY VIEWS OF THE RIGHT HAND; AP AND LATERAL XRAY VIEWS OF THE RIGHT FOREARM; ONE XRAY VIEW OF THE PELVIS 5/13/2019 6:03 pm COMPARISON: None. HISTORY: ORDERING SYSTEM PROVIDED HISTORY: Concern for fracture TECHNOLOGIST PROVIDED HISTORY: Concern for fracture Ordering Physician Provided Reason for Exam: 2 Acuity: Acute Type of Exam: Initial; ORDERING SYSTEM PROVIDED HISTORY: Concern for fracture TECHNOLOGIST PROVIDED HISTORY: Concern for fracture Ordering Physician Provided Reason for Exam: pt fell Acuity: Acute Type of Exam: Initial; ORDERING SYSTEM PROVIDED HISTORY: Fall. TECHNOLOGIST PROVIDED HISTORY: Fall. Ordering Physician Provided Reason for Exam: pt fell onto left side Acuity: Acute Type of Exam: Initial FINDINGS: Right hand: There is a chronic appearing scaphoid waist fracture with nonunion of fracture fragments. There are nonspecific periarticular erosions of the proximal carpal row. An acute fracture of the wrist is present. There may also be a chip fracture of the lunate.  Right forearm: No fracture of the proximal forearm is identified. There is a comminuted, impacted intra-articular fracture of the distal radius with a mildly displaced fracture of the ulnar styloid. Pelvic x-ray: No acute fracture or hip dislocation is identified. Acute fracture of the right wrist. Additional incidental findings as detailed above. Xr Wrist Right (2 Views)    Result Date: 5/13/2019  EXAMINATION: 2 XRAY VIEWS OF THE RIGHT WRIST 5/13/2019 8:24 pm COMPARISON: None. HISTORY: ORDERING SYSTEM PROVIDED HISTORY: Post-Reduction TECHNOLOGIST PROVIDED HISTORY: Post-Reduction Ordering Physician Provided Reason for Exam: post reduction right wrist Acuity: Acute Type of Exam: Subsequent/Follow-up FINDINGS: Comminuted intra-articular impacted fracture distal radius. Avulsion fracture ulnar styloid. Cystic changes within the proximal row of carpal bones. Alignment anatomic. Soft tissue swelling. Subcutaneous gas possibly dorsally. Fracture distal radius and ulna     Xr Wrist Right (min 3 Views)    Right wrist reduction with improved alignment of fracture fragments. No acute findings involving the right knee. Xr Wrist Right (min 3 Views)    Result Date: 5/13/2019  EXAMINATION: 3 XRAY VIEWS OF THE RIGHT WRIST 5/13/2019 6:03 pm COMPARISON: None. HISTORY: ORDERING SYSTEM PROVIDED HISTORY: Concern for fracture TECHNOLOGIST PROVIDED HISTORY: Concern for fracture Ordering Physician Provided Reason for Exam: fell off ladder Acuity: Acute Type of Exam: Initial FINDINGS: There is a comminuted, impacted intra-articular fracture of the distal radius. There is also a mildly displaced fracture of the ulnar styloid. A scaphoid waist fracture is present, possibly chronic with nonunion of fracture fragments. There are nonspecific periarticular erosions of the proximal carpal row. Acute fracture of the wrist. Age-indeterminate but likely chronic fracture of the scaphoid. Nonspecific periarticular erosions of the proximal carpal row.   Joint aspiration may be considered to rule out the possibility of infection. Xr Hand Right (min 3 Views)    Result Date: 5/13/2019  EXAMINATION: 3 XRAY VIEWS OF THE RIGHT HAND; AP AND LATERAL XRAY VIEWS OF THE RIGHT FOREARM; ONE XRAY VIEW OF THE PELVIS 5/13/2019 6:03 pm COMPARISON: None. HISTORY: ORDERING SYSTEM PROVIDED HISTORY: Concern for fracture TECHNOLOGIST PROVIDED HISTORY: Concern for fracture Ordering Physician Provided Reason for Exam: 2 Acuity: Acute Type of Exam: Initial; ORDERING SYSTEM PROVIDED HISTORY: Concern for fracture TECHNOLOGIST PROVIDED HISTORY: Concern for fracture Ordering Physician Provided Reason for Exam: pt fell Acuity: Acute Type of Exam: Initial; ORDERING SYSTEM PROVIDED HISTORY: Fall. TECHNOLOGIST PROVIDED HISTORY: Fall. Ordering Physician Provided Reason for Exam: pt fell onto left side Acuity: Acute Type of Exam: Initial FINDINGS: Right hand: There is a chronic appearing scaphoid waist fracture with nonunion of fracture fragments. There are nonspecific periarticular erosions of the proximal carpal row. An acute fracture of the wrist is present. There may also be a chip fracture of the lunate. Right forearm: No fracture of the proximal forearm is identified. There is a comminuted, impacted intra-articular fracture of the distal radius with a mildly displaced fracture of the ulnar styloid. Pelvic x-ray: No acute fracture or hip dislocation is identified. Acute fracture of the right wrist. Additional incidental findings as detailed above. Xr Knee Right (3 Views)    Right wrist reduction with improved alignment of fracture fragments. No acute findings involving the right knee.      Ct Head Wo Contrast    Result Date: 5/13/2019  EXAMINATION: CT OF THE HEAD WITHOUT CONTRAST  5/13/2019 5:59 pm TECHNIQUE: CT of the head was performed without the administration of intravenous contrast. Dose modulation, iterative reconstruction, and/or weight based adjustment of the mA/kV was utilized administration of intravenous contrast. Multiplanar reformatted images are provided for review. Dose modulation, iterative reconstruction, and/or weight based adjustment of the mA/kV was utilized to reduce the radiation dose to as low as reasonably achievable.; CT of the lumbar spine was performed without the administration of intravenous contrast. Multiplanar reformatted images are provided for review. Dose modulation, iterative reconstruction, and/or weight based adjustment of the mA/kV was utilized to reduce the radiation dose to as low as reasonably achievable. COMPARISON: None. HISTORY: ORDERING SYSTEM PROVIDED HISTORY: fall > 12 ft TECHNOLOGIST PROVIDED HISTORY: Ordering Physician Provided Reason for Exam: fall Acuity: Acute Type of Exam: Initial; ORDERING SYSTEM PROVIDED HISTORY: fall TECHNOLOGIST PROVIDED HISTORY: fall FINDINGS: BONES/ALIGNMENT:  There is no gross evidence of an acute fracture of the thoracic or lumbar spine. There is normal alignment of the spine. DEGENERATIVE CHANGES: No significant degenerative changes of the thoracic or lumbar spine. SOFT TISSUES: No paraspinal mass is seen. No evidence of acute traumatic injury of the thoracic or lumbar spine. Ct Lumbar Spine Wo Contrast    Result Date: 5/13/2019  EXAMINATION: CT OF THE THORACIC SPINE WITHOUT CONTRAST; CT OF THE LUMBAR SPINE WITHOUT CONTRAST 5/13/2019 TECHNIQUE: CT of the thoracic spine was performed without the administration of intravenous contrast. Multiplanar reformatted images are provided for review. Dose modulation, iterative reconstruction, and/or weight based adjustment of the mA/kV was utilized to reduce the radiation dose to as low as reasonably achievable.; CT of the lumbar spine was performed without the administration of intravenous contrast. Multiplanar reformatted images are provided for review.  Dose modulation, iterative reconstruction, and/or weight based adjustment of the mA/kV was utilized to reduce the radiation dose to as low as reasonably achievable. COMPARISON: None. HISTORY: ORDERING SYSTEM PROVIDED HISTORY: fall > 12 ft TECHNOLOGIST PROVIDED HISTORY: Ordering Physician Provided Reason for Exam: fall Acuity: Acute Type of Exam: Initial; ORDERING SYSTEM PROVIDED HISTORY: fall TECHNOLOGIST PROVIDED HISTORY: fall FINDINGS: BONES/ALIGNMENT:  There is no gross evidence of an acute fracture of the thoracic or lumbar spine. There is normal alignment of the spine. DEGENERATIVE CHANGES: No significant degenerative changes of the thoracic or lumbar spine. SOFT TISSUES: No paraspinal mass is seen. No evidence of acute traumatic injury of the thoracic or lumbar spine. Ct Chest Abdomen Pelvis W Contrast    Result Date: 5/13/2019  EXAMINATION: CT OF THE CHEST, ABDOMEN, AND PELVIS WITH CONTRAST 5/13/2019 6:25 pm TECHNIQUE: CT of the chest, abdomen and pelvis was performed with the administration of intravenous contrast. Multiplanar reformatted images are provided for review. Dose modulation, iterative reconstruction, and/or weight based adjustment of the mA/kV was utilized to reduce the radiation dose to as low as reasonably achievable. COMPARISON: None HISTORY: ORDERING SYSTEM PROVIDED HISTORY: fall > 12 feet TECHNOLOGIST PROVIDED HISTORY: FINDINGS: Chest: Mediastinum:  Thoracic aorta and central portion of the pulmonary artery opacify normally. The ascending thoracic aorta is of normal caliber. Heart size is normal. There is no pericardial effusion. No mediastinal or hilar lymph nodes exceed the CT criteria for abnormal enlargement. Lungs/pleura: 5 mm pulmonary nodule left lower lobe on image number [id number]. lungs are otherwise clear. Soft Tissues/Bones: Normal Abdomen/Pelvis: Organs: The abdominal wall appears normal. The liver, spleen, pancreas, and adrenals appear normal.  Gallbladder normal. Kidneys appear normal. The bladder appears normal. GI/Bowel:  The stomach,small bowel, and colon appear normal. Appendix normal. Pelvis: Normal Peritoneum/Retroperitoneum: The abdominal aorta and iliac arteries are normal in caliber. There is no pathologic adenopathy. Bones/Soft Tissues: Normal     No acute traumatic sequelae. 5 mm left lower lobe pulmonary nodule. Recommend follow-up CT chest in 3-6 months. Differential considerations include infectious, inflammatory, and neoplastic etiologies. EKG  None    All EKG's are interpreted by the Emergency Department Physician who either signs or Co-signs this chart in the absence of a cardiologist.    EMERGENCY DEPARTMENT COURSE:    Patient was splinted by orthopedic surgery, no further recommendations from orthopedic surgery, follow-up with Dr. Paz Mello in clinic in 1 week. Patient's C-spine was cleared by trauma surgery, patient is okay to be discharged per the trauma surgery team.  We will send the patient home with orthopedic surgery follow-up. Patient was updated regarding the pulmonary nodule, says that he has a history of smoking, says that he will follow up with a primary care physician and get the repeat imaging as needed for follow-up. PROCEDURES:  None    CONSULTS:  IP CONSULT TO TRAUMA SURGERY  IP CONSULT TO ORTHOPEDIC SURGERY    CRITICAL CARE:  None    FINAL IMPRESSION      1. Pulmonary nodule    2. Closed fracture of distal ends of right radius and ulna, initial encounter          DISPOSITION / PLAN     DISPOSITION        PATIENT REFERRED TO:  Cally Mike MD  [address] [place] [address]  [phone]    Call in 1 day  Call and set up appointment with orthopedic surgery      DISCHARGE MEDICATIONS:  Discharge Medication List as of 5/13/2019  8:32 PM      START taking these medications    Details   oxyCODONE-acetaminophen (PERCOCET) 5-325 MG per tablet Take 1 tablet by mouth every 8 hours as needed for Pain for up to 7 days. Intended supply: 5 days.  Take lowest dose possible to manage pain, Disp-20 tablet, R-0Print      naproxen (NAPROSYN) 500 MG tablet Take 1 tablet by mouth 2 times daily (with meals) for 30 doses, Disp-60 tablet, R-0Print             Ena Lima MD  Emergency Medicine Resident    (Please note that portions of thisnote were completed with a voice recognition program.  Efforts were made to edit the dictations but occasionally words are mis-transcribed.)       Ena Lima MD  05/13/19 [id number]

## 2019-05-14 NOTE — ED NOTES
Right arm splint applied by ortho.  Good cap refill and sensation to digits of right hand, Pt reports \"it feels a lot better\"     Balibna Mcclelland, RN  05/13/19 2032

## 2019-05-21 DIAGNOSIS — S69.91XA INJURY OF RIGHT WRIST, INITIAL ENCOUNTER: Primary | ICD-10-CM

## 2019-05-22 ENCOUNTER — OFFICE VISIT (OUTPATIENT)
Dept: ORTHOPEDIC SURGERY | Age: 39
End: 2019-05-22
Payer: MEDICAID

## 2019-05-22 VITALS — BODY MASS INDEX: 25.59 KG/M2 | HEIGHT: 72 IN | WEIGHT: 188.93 LBS

## 2019-05-22 DIAGNOSIS — S52.591A OTHER CLOSED FRACTURE OF DISTAL END OF RIGHT RADIUS, INITIAL ENCOUNTER: ICD-10-CM

## 2019-05-22 DIAGNOSIS — S69.91XA INJURY OF RIGHT WRIST, INITIAL ENCOUNTER: Primary | ICD-10-CM

## 2019-05-22 PROCEDURE — G8427 DOCREV CUR MEDS BY ELIG CLIN: HCPCS | Performed by: STUDENT IN AN ORGANIZED HEALTH CARE EDUCATION/TRAINING PROGRAM

## 2019-05-22 PROCEDURE — 99203 OFFICE O/P NEW LOW 30 MIN: CPT | Performed by: STUDENT IN AN ORGANIZED HEALTH CARE EDUCATION/TRAINING PROGRAM

## 2019-05-22 PROCEDURE — 1036F TOBACCO NON-USER: CPT | Performed by: STUDENT IN AN ORGANIZED HEALTH CARE EDUCATION/TRAINING PROGRAM

## 2019-05-22 PROCEDURE — G8419 CALC BMI OUT NRM PARAM NOF/U: HCPCS | Performed by: STUDENT IN AN ORGANIZED HEALTH CARE EDUCATION/TRAINING PROGRAM

## 2019-05-22 RX ORDER — TRAMADOL HYDROCHLORIDE 50 MG/1
50 TABLET ORAL EVERY 4 HOURS PRN
Qty: 30 TABLET | Refills: 0 | Status: SHIPPED | OUTPATIENT
Start: 2019-05-22 | End: 2019-05-27

## 2019-05-23 ENCOUNTER — TELEPHONE (OUTPATIENT)
Dept: ORTHOPEDIC SURGERY | Age: 39
End: 2019-05-23

## 2019-05-23 NOTE — TELEPHONE ENCOUNTER
Patient called stating that he needs a prior authorization for his pain medication (Tramadol). I have not received any faxes from his insurance or from his pharmacy. I did notify the patient he can \"self pay\" but he can not afford it. I will continue to watch for the request and have it done as promptly as possible.

## 2019-05-25 NOTE — PROGRESS NOTES
[address] [facility] [address]  Dept: [phone]    Ambulatory Orthopedic Office Visit      CHIEF COMPLAINT:    Chief Complaint   Patient presents with    Follow-up     ed f/u [facility] for displaced rt wrist fx        HISTORY OF PRESENT ILLNESS:    The patient is a left hand dominant 42 y.o.male who is being seen for evaluation of a right distal radius fracture. Patient sustained injury about 2 weeks ago on 5/13/19. Patient was working about 12 feet in the air on scaffolding which became loose and he fell to the ground. He fell onto his outstretched right arm and had immediate pain and tenderness to the right wrist. He presented to the emergency department where he was then found to have a distal radius fracture which was subsequently reduced and splinted. Patient presents today and his splint. He notes that he has maintained nonweightbearing status. He denies any numbness or tingling in his fingers. Past Medical History:    History reviewed. No pertinent past medical history. Past Surgical History:    History reviewed. No pertinent surgical history. Current Medications:   Current Outpatient Medications   Medication Sig Dispense Refill    traMADol (ULTRAM) 50 MG tablet Take 1 tablet by mouth every 4 hours as needed for Pain for up to 5 days. Intended supply: 5 days. Take lowest dose possible to manage pain 30 tablet 0    naproxen (NAPROSYN) 500 MG tablet Take 1 tablet by mouth 2 times daily (with meals) for 30 doses 60 tablet 0     No current facility-administered medications for this visit. Allergies:    Patient has no known allergies.     Social History:   Social History     Socioeconomic History    Marital status:      Spouse name: Not on file    Number of children: Not on file    Years of education: Not on file    Highest education level: Not on file   Occupational History    Not on file   Social Needs    Financial resource strain: Not on file    Food insecurity:     Worry: Not on file     Inability: Not on file    Transportation needs:     Medical: Not on file     Non-medical: Not on file   Tobacco Use    Smoking status: Never Smoker    Smokeless tobacco: Never Used   Substance and Sexual Activity    Alcohol use: Yes     Comment: socially    Drug use: Yes     Types: Marijuana    Sexual activity: Not on file   Lifestyle    Physical activity:     Days per week: Not on file     Minutes per session: Not on file    Stress: Not on file   Relationships    Social connections:     Talks on phone: Not on file     Gets together: Not on file     Attends Taoism service: Not on file     Active member of club or organization: Not on file     Attends meetings of clubs or organizations: Not on file     Relationship status: Not on file    Intimate partner violence:     Fear of current or ex partner: Not on file     Emotionally abused: Not on file     Physically abused: Not on file     Forced sexual activity: Not on file   Other Topics Concern    Not on file   Social History Narrative    Not on file       Family History:  History reviewed. No pertinent family history. REVIEW OF SYSTEMS:  Constitutional: Negative for fever and chills. HENT: Negative for congestion or drainage   Eyes: Negative for blurred vision and double vision. Respiratory: Negative for cough, shortnessof breath and wheezing. Cardiovascular: Negative for chest pain and palpitations. Gastrointestinal: Negative for nausea. Negative for vomiting. Musculoskeletal: Positive for myalgias and joint pain. Skin:Negative for itching and rash. Neurological: Negative for dizziness, sensory change and headaches. PHYSICAL EXAM:  Ht 6' 0.01\" (1.829 m)   Wt 188 lb 15 oz (85.7 kg)   BMI 25.62 kg/m²   Physical Exam  Gen: alert and oriented  Psych:  Appropriate affect; Appropriate knowledge base; Appropriate mood; No hallucinations;    Head: Dispense:  30 tablet     Refill:  0     Reduce doses taken as pain becomes manageable       No orders of the defined types were placed in this encounter. Reviewed Subjective section with patient who did agree and confirmed everything documented. Discussed plan and patient expressed understanding of diagnosis andprognosis with plan as stated. All questions answered.      Rey Aquino DO   Orthopedic Surgery Resident PGY-1  [address] [facility]

## 2019-05-29 ENCOUNTER — ANESTHESIA EVENT (OUTPATIENT)
Dept: OPERATING ROOM | Age: 39
End: 2019-05-29
Payer: MEDICAID

## 2019-05-30 ENCOUNTER — HOSPITAL ENCOUNTER (OUTPATIENT)
Age: 39
Setting detail: OUTPATIENT SURGERY
Discharge: HOME | End: 2019-05-30
Attending: ORTHOPAEDIC SURGERY | Admitting: ORTHOPAEDIC SURGERY
Payer: MEDICAID

## 2019-05-30 ENCOUNTER — APPOINTMENT (OUTPATIENT)
Dept: GENERAL RADIOLOGY | Age: 39
End: 2019-05-30
Attending: ORTHOPAEDIC SURGERY
Payer: MEDICAID

## 2019-05-30 ENCOUNTER — ANESTHESIA (OUTPATIENT)
Dept: OPERATING ROOM | Age: 39
End: 2019-05-30
Payer: MEDICAID

## 2019-05-30 VITALS — TEMPERATURE: 95.7 F | SYSTOLIC BLOOD PRESSURE: 99 MMHG | DIASTOLIC BLOOD PRESSURE: 53 MMHG | OXYGEN SATURATION: 100 %

## 2019-05-30 VITALS
OXYGEN SATURATION: 99 % | TEMPERATURE: 97.2 F | HEART RATE: 60 BPM | SYSTOLIC BLOOD PRESSURE: 125 MMHG | RESPIRATION RATE: 16 BRPM | HEIGHT: 72 IN | WEIGHT: 189.6 LBS | DIASTOLIC BLOOD PRESSURE: 75 MMHG | BODY MASS INDEX: 25.68 KG/M2

## 2019-05-30 DIAGNOSIS — G89.18 POST-OP PAIN: ICD-10-CM

## 2019-05-30 DIAGNOSIS — S52.571A OTHER CLOSED INTRA-ARTICULAR FRACTURE OF DISTAL END OF RIGHT RADIUS, INITIAL ENCOUNTER: Primary | ICD-10-CM

## 2019-05-30 PROCEDURE — 6360000002 HC RX W HCPCS: Performed by: ORTHOPAEDIC SURGERY

## 2019-05-30 PROCEDURE — C1713 ANCHOR/SCREW BN/BN,TIS/BN: HCPCS | Performed by: ORTHOPAEDIC SURGERY

## 2019-05-30 PROCEDURE — 2580000003 HC RX 258: Performed by: ANESTHESIOLOGY

## 2019-05-30 PROCEDURE — 2500000003 HC RX 250 WO HCPCS: Performed by: ANESTHESIOLOGY

## 2019-05-30 PROCEDURE — 73110 X-RAY EXAM OF WRIST: CPT

## 2019-05-30 PROCEDURE — 2709999900 HC NON-CHARGEABLE SUPPLY: Performed by: ORTHOPAEDIC SURGERY

## 2019-05-30 PROCEDURE — 7100000040 HC SPAR PHASE II RECOVERY - FIRST 15 MIN: Performed by: ORTHOPAEDIC SURGERY

## 2019-05-30 PROCEDURE — 3600000014 HC SURGERY LEVEL 4 ADDTL 15MIN: Performed by: ORTHOPAEDIC SURGERY

## 2019-05-30 PROCEDURE — 64415 NJX AA&/STRD BRCH PLXS IMG: CPT | Performed by: ANESTHESIOLOGY

## 2019-05-30 PROCEDURE — 7100000041 HC SPAR PHASE II RECOVERY - ADDTL 15 MIN: Performed by: ORTHOPAEDIC SURGERY

## 2019-05-30 PROCEDURE — 6360000002 HC RX W HCPCS: Performed by: NURSE ANESTHETIST, CERTIFIED REGISTERED

## 2019-05-30 PROCEDURE — 6360000002 HC RX W HCPCS: Performed by: ANESTHESIOLOGY

## 2019-05-30 PROCEDURE — 2720000010 HC SURG SUPPLY STERILE: Performed by: ORTHOPAEDIC SURGERY

## 2019-05-30 PROCEDURE — 2500000003 HC RX 250 WO HCPCS: Performed by: NURSE ANESTHETIST, CERTIFIED REGISTERED

## 2019-05-30 PROCEDURE — 7100000001 HC PACU RECOVERY - ADDTL 15 MIN: Performed by: ORTHOPAEDIC SURGERY

## 2019-05-30 PROCEDURE — 7100000000 HC PACU RECOVERY - FIRST 15 MIN: Performed by: ORTHOPAEDIC SURGERY

## 2019-05-30 PROCEDURE — 3700000001 HC ADD 15 MINUTES (ANESTHESIA): Performed by: ORTHOPAEDIC SURGERY

## 2019-05-30 PROCEDURE — 3700000000 HC ANESTHESIA ATTENDED CARE: Performed by: ORTHOPAEDIC SURGERY

## 2019-05-30 PROCEDURE — 3600000004 HC SURGERY LEVEL 4 BASE: Performed by: ORTHOPAEDIC SURGERY

## 2019-05-30 PROCEDURE — 25609 OPTX DST RD XART FX/EP SEP3+: CPT | Performed by: ORTHOPAEDIC SURGERY

## 2019-05-30 DEVICE — SCREW BNE L16MM DIA2.7MM CORT S STL ST T8 STARDRV RECESS: Type: IMPLANTABLE DEVICE | Site: WRIST | Status: FUNCTIONAL

## 2019-05-30 DEVICE — SCREW BNE L22MM DIA2.4MM DST RAD VOLAR S STL ST VAR ANG LOK: Type: IMPLANTABLE DEVICE | Site: WRIST | Status: FUNCTIONAL

## 2019-05-30 DEVICE — PLATE BNE L57MM 6X5 H NONSTERILE R DST RAD VOLAR RIM S STL: Type: IMPLANTABLE DEVICE | Site: WRIST | Status: FUNCTIONAL

## 2019-05-30 DEVICE — IMPLANTABLE DEVICE
Type: IMPLANTABLE DEVICE | Site: WRIST | Status: FUNCTIONAL
Brand: MICROAIRE®

## 2019-05-30 DEVICE — SCREW BNE L20MM DIA2.4MM DST RAD VOLAR S STL ST VAR ANG LOK: Type: IMPLANTABLE DEVICE | Site: WRIST | Status: FUNCTIONAL

## 2019-05-30 DEVICE — SCREW BNE L24MM DIA2.4MM CORT S STL ST T8 STARDRV RECESS: Type: IMPLANTABLE DEVICE | Site: WRIST | Status: FUNCTIONAL

## 2019-05-30 DEVICE — SCREW BNE L18MM DIA2.7MM CORT S STL ST T8 STARDRV RECESS: Type: IMPLANTABLE DEVICE | Site: WRIST | Status: FUNCTIONAL

## 2019-05-30 RX ORDER — GLYCOPYRROLATE 0.2 MG/ML
0.1 INJECTION INTRAMUSCULAR; INTRAVENOUS ONCE
Status: COMPLETED | OUTPATIENT
Start: 2019-05-30 | End: 2019-05-30

## 2019-05-30 RX ORDER — BUPIVACAINE HYDROCHLORIDE 5 MG/ML
20 INJECTION, SOLUTION EPIDURAL; INTRACAUDAL ONCE
Status: DISCONTINUED | OUTPATIENT
Start: 2019-05-30 | End: 2019-05-30 | Stop reason: HOSPADM

## 2019-05-30 RX ORDER — FENTANYL CITRATE 50 UG/ML
50 INJECTION, SOLUTION INTRAMUSCULAR; INTRAVENOUS EVERY 5 MIN PRN
Status: DISCONTINUED | OUTPATIENT
Start: 2019-05-30 | End: 2019-05-30 | Stop reason: HOSPADM

## 2019-05-30 RX ORDER — OXYCODONE HYDROCHLORIDE AND ACETAMINOPHEN 5; 325 MG/1; MG/1
1 TABLET ORAL EVERY 6 HOURS PRN
Qty: 28 TABLET | Refills: 0 | Status: SHIPPED | OUTPATIENT
Start: 2019-05-30 | End: 2019-06-06

## 2019-05-30 RX ORDER — GLYCOPYRROLATE 1 MG/5 ML
SYRINGE (ML) INTRAVENOUS PRN
Status: DISCONTINUED | OUTPATIENT
Start: 2019-05-30 | End: 2019-05-30 | Stop reason: SDUPTHER

## 2019-05-30 RX ORDER — LIDOCAINE HYDROCHLORIDE 10 MG/ML
1 INJECTION, SOLUTION EPIDURAL; INFILTRATION; INTRACAUDAL; PERINEURAL
Status: DISCONTINUED | OUTPATIENT
Start: 2019-05-30 | End: 2019-05-30 | Stop reason: HOSPADM

## 2019-05-30 RX ORDER — FENTANYL CITRATE 50 UG/ML
25 INJECTION, SOLUTION INTRAMUSCULAR; INTRAVENOUS EVERY 5 MIN PRN
Status: DISCONTINUED | OUTPATIENT
Start: 2019-05-30 | End: 2019-05-30 | Stop reason: HOSPADM

## 2019-05-30 RX ORDER — GLYCOPYRROLATE 0.2 MG/ML
0.2 INJECTION INTRAMUSCULAR; INTRAVENOUS ONCE
Status: COMPLETED | OUTPATIENT
Start: 2019-05-30 | End: 2019-05-30

## 2019-05-30 RX ORDER — ROCURONIUM BROMIDE 10 MG/ML
INJECTION, SOLUTION INTRAVENOUS PRN
Status: DISCONTINUED | OUTPATIENT
Start: 2019-05-30 | End: 2019-05-30 | Stop reason: SDUPTHER

## 2019-05-30 RX ORDER — NEOSTIGMINE METHYLSULFATE 5 MG/5 ML
SYRINGE (ML) INTRAVENOUS PRN
Status: DISCONTINUED | OUTPATIENT
Start: 2019-05-30 | End: 2019-05-30 | Stop reason: SDUPTHER

## 2019-05-30 RX ORDER — MIDAZOLAM HYDROCHLORIDE 1 MG/ML
2 INJECTION INTRAMUSCULAR; INTRAVENOUS ONCE
Status: COMPLETED | OUTPATIENT
Start: 2019-05-30 | End: 2019-05-30

## 2019-05-30 RX ORDER — DIPHENHYDRAMINE HYDROCHLORIDE 50 MG/ML
12.5 INJECTION INTRAMUSCULAR; INTRAVENOUS
Status: DISCONTINUED | OUTPATIENT
Start: 2019-05-30 | End: 2019-05-30 | Stop reason: HOSPADM

## 2019-05-30 RX ORDER — DEXAMETHASONE SODIUM PHOSPHATE 10 MG/ML
INJECTION INTRAMUSCULAR; INTRAVENOUS PRN
Status: DISCONTINUED | OUTPATIENT
Start: 2019-05-30 | End: 2019-05-30 | Stop reason: SDUPTHER

## 2019-05-30 RX ORDER — ONDANSETRON 2 MG/ML
INJECTION INTRAMUSCULAR; INTRAVENOUS PRN
Status: DISCONTINUED | OUTPATIENT
Start: 2019-05-30 | End: 2019-05-30 | Stop reason: SDUPTHER

## 2019-05-30 RX ORDER — ONDANSETRON 2 MG/ML
4 INJECTION INTRAMUSCULAR; INTRAVENOUS
Status: DISCONTINUED | OUTPATIENT
Start: 2019-05-30 | End: 2019-05-30 | Stop reason: HOSPADM

## 2019-05-30 RX ORDER — PHENYLEPHRINE HYDROCHLORIDE 10 MG/ML
INJECTION INTRAVENOUS PRN
Status: DISCONTINUED | OUTPATIENT
Start: 2019-05-30 | End: 2019-05-30 | Stop reason: SDUPTHER

## 2019-05-30 RX ORDER — PROPOFOL 10 MG/ML
INJECTION, EMULSION INTRAVENOUS PRN
Status: DISCONTINUED | OUTPATIENT
Start: 2019-05-30 | End: 2019-05-30 | Stop reason: SDUPTHER

## 2019-05-30 RX ORDER — FENTANYL CITRATE 50 UG/ML
INJECTION, SOLUTION INTRAMUSCULAR; INTRAVENOUS PRN
Status: DISCONTINUED | OUTPATIENT
Start: 2019-05-30 | End: 2019-05-30 | Stop reason: SDUPTHER

## 2019-05-30 RX ORDER — SODIUM CHLORIDE, SODIUM LACTATE, POTASSIUM CHLORIDE, CALCIUM CHLORIDE 600; 310; 30; 20 MG/100ML; MG/100ML; MG/100ML; MG/100ML
INJECTION, SOLUTION INTRAVENOUS CONTINUOUS
Status: DISCONTINUED | OUTPATIENT
Start: 2019-05-30 | End: 2019-05-30 | Stop reason: HOSPADM

## 2019-05-30 RX ADMIN — SODIUM CHLORIDE, POTASSIUM CHLORIDE, SODIUM LACTATE AND CALCIUM CHLORIDE: 600; 310; 30; 20 INJECTION, SOLUTION INTRAVENOUS at 13:10

## 2019-05-30 RX ADMIN — PHENYLEPHRINE HYDROCHLORIDE 100 MCG: 10 INJECTION INTRAVENOUS at 14:26

## 2019-05-30 RX ADMIN — GLYCOPYRROLATE 0.1 MG: 0.2 INJECTION INTRAMUSCULAR; INTRAVENOUS at 11:58

## 2019-05-30 RX ADMIN — MIDAZOLAM HYDROCHLORIDE 2 MG: 1 INJECTION, SOLUTION INTRAMUSCULAR; INTRAVENOUS at 11:55

## 2019-05-30 RX ADMIN — ROCURONIUM BROMIDE 50 MG: 10 INJECTION INTRAVENOUS at 13:18

## 2019-05-30 RX ADMIN — SODIUM CHLORIDE, POTASSIUM CHLORIDE, SODIUM LACTATE AND CALCIUM CHLORIDE: 600; 310; 30; 20 INJECTION, SOLUTION INTRAVENOUS at 15:11

## 2019-05-30 RX ADMIN — FENTANYL CITRATE 50 MCG: 50 INJECTION, SOLUTION INTRAMUSCULAR; INTRAVENOUS at 16:08

## 2019-05-30 RX ADMIN — GLYCOPYRROLATE 0.2 MG: 0.2 INJECTION INTRAMUSCULAR; INTRAVENOUS at 13:04

## 2019-05-30 RX ADMIN — Medication 0.6 MG: at 14:57

## 2019-05-30 RX ADMIN — PROPOFOL 200 MG: 10 INJECTION, EMULSION INTRAVENOUS at 13:18

## 2019-05-30 RX ADMIN — Medication 3 MG: at 14:57

## 2019-05-30 RX ADMIN — SODIUM CHLORIDE, POTASSIUM CHLORIDE, SODIUM LACTATE AND CALCIUM CHLORIDE: 600; 310; 30; 20 INJECTION, SOLUTION INTRAVENOUS at 10:30

## 2019-05-30 RX ADMIN — DEXAMETHASONE SODIUM PHOSPHATE 10 MG: 10 INJECTION INTRAMUSCULAR; INTRAVENOUS at 13:42

## 2019-05-30 RX ADMIN — ONDANSETRON 4 MG: 2 INJECTION, SOLUTION INTRAMUSCULAR; INTRAVENOUS at 14:48

## 2019-05-30 RX ADMIN — FENTANYL CITRATE 100 MCG: 50 INJECTION INTRAMUSCULAR; INTRAVENOUS at 13:39

## 2019-05-30 RX ADMIN — FENTANYL CITRATE 50 MCG: 50 INJECTION, SOLUTION INTRAMUSCULAR; INTRAVENOUS at 11:55

## 2019-05-30 RX ADMIN — Medication 2 G: at 13:21

## 2019-05-30 RX ADMIN — FENTANYL CITRATE 100 MCG: 50 INJECTION INTRAMUSCULAR; INTRAVENOUS at 13:18

## 2019-05-30 ASSESSMENT — PULMONARY FUNCTION TESTS
PIF_VALUE: 16
PIF_VALUE: 17
PIF_VALUE: 16
PIF_VALUE: 15
PIF_VALUE: 17
PIF_VALUE: 16
PIF_VALUE: 14
PIF_VALUE: 16
PIF_VALUE: 17
PIF_VALUE: 16
PIF_VALUE: 14
PIF_VALUE: 16
PIF_VALUE: 17
PIF_VALUE: 20
PIF_VALUE: 14
PIF_VALUE: 16
PIF_VALUE: 16
PIF_VALUE: 17
PIF_VALUE: 1
PIF_VALUE: 16
PIF_VALUE: 16
PIF_VALUE: 1
PIF_VALUE: 3
PIF_VALUE: 15
PIF_VALUE: 18
PIF_VALUE: 15
PIF_VALUE: 16
PIF_VALUE: 16
PIF_VALUE: 17
PIF_VALUE: 15
PIF_VALUE: 16
PIF_VALUE: 17
PIF_VALUE: 16
PIF_VALUE: 18
PIF_VALUE: 18
PIF_VALUE: 16
PIF_VALUE: 14
PIF_VALUE: 14
PIF_VALUE: 17
PIF_VALUE: 16
PIF_VALUE: 16
PIF_VALUE: 1
PIF_VALUE: 17
PIF_VALUE: 0
PIF_VALUE: 16
PIF_VALUE: 15
PIF_VALUE: 16
PIF_VALUE: 17
PIF_VALUE: 16
PIF_VALUE: 0
PIF_VALUE: 17
PIF_VALUE: 16
PIF_VALUE: 17
PIF_VALUE: 1
PIF_VALUE: 14
PIF_VALUE: 17
PIF_VALUE: 16
PIF_VALUE: 16
PIF_VALUE: 15
PIF_VALUE: 15
PIF_VALUE: 16
PIF_VALUE: 16
PIF_VALUE: 18
PIF_VALUE: 1
PIF_VALUE: 18
PIF_VALUE: 3
PIF_VALUE: 16
PIF_VALUE: 17
PIF_VALUE: 18
PIF_VALUE: 17
PIF_VALUE: 16
PIF_VALUE: 17
PIF_VALUE: 15
PIF_VALUE: 16
PIF_VALUE: 18
PIF_VALUE: 16
PIF_VALUE: 15
PIF_VALUE: 16
PIF_VALUE: 1
PIF_VALUE: 17
PIF_VALUE: 19
PIF_VALUE: 16
PIF_VALUE: 18
PIF_VALUE: 16
PIF_VALUE: 14
PIF_VALUE: 16
PIF_VALUE: 17
PIF_VALUE: 16
PIF_VALUE: 18
PIF_VALUE: 16
PIF_VALUE: 24
PIF_VALUE: 16
PIF_VALUE: 17
PIF_VALUE: 16
PIF_VALUE: 14
PIF_VALUE: 14
PIF_VALUE: 16
PIF_VALUE: 15
PIF_VALUE: 16

## 2019-05-30 ASSESSMENT — PAIN DESCRIPTION - LOCATION: LOCATION: ARM

## 2019-05-30 ASSESSMENT — PAIN DESCRIPTION - PAIN TYPE: TYPE: SURGICAL PAIN

## 2019-05-30 ASSESSMENT — PAIN SCALES - GENERAL
PAINLEVEL_OUTOF10: 0
PAINLEVEL_OUTOF10: 5
PAINLEVEL_OUTOF10: 0
PAINLEVEL_OUTOF10: 7

## 2019-05-30 ASSESSMENT — PAIN DESCRIPTION - DESCRIPTORS
DESCRIPTORS: DISCOMFORT;ACHING;BURNING
DESCRIPTORS: ACHING;DISCOMFORT;DULL

## 2019-05-30 ASSESSMENT — PAIN - FUNCTIONAL ASSESSMENT
PAIN_FUNCTIONAL_ASSESSMENT: PREVENTS OR INTERFERES SOME ACTIVE ACTIVITIES AND ADLS
PAIN_FUNCTIONAL_ASSESSMENT: 0-10
PAIN_FUNCTIONAL_ASSESSMENT: ACTIVITIES ARE NOT PREVENTED

## 2019-05-30 ASSESSMENT — ENCOUNTER SYMPTOMS
STRIDOR: 0
SHORTNESS OF BREATH: 0

## 2019-05-30 ASSESSMENT — PAIN DESCRIPTION - ORIENTATION: ORIENTATION: UPPER;RIGHT

## 2019-05-30 ASSESSMENT — PAIN DESCRIPTION - ONSET: ONSET: UNABLE TO TELL

## 2019-05-30 NOTE — ANESTHESIA PROCEDURE NOTES
Peripheral Block    Patient location during procedure: pre-op  Start time: 5/30/2019 11:56 AM  End time: 5/30/2019 12:03 PM  Staffing  Anesthesiologist: Cornell Ganser, MD  Performed: anesthesiologist   Preanesthetic Checklist  Completed: patient identified, site marked, surgical consent, pre-op evaluation, timeout performed, IV checked, risks and benefits discussed, monitors and equipment checked, anesthesia consent given, oxygen available and patient being monitored  Peripheral Block  Patient position: sitting  Prep: ChloraPrep  Patient monitoring: cardiac monitor, continuous pulse ox, frequent blood pressure checks and IV access  Block type: Brachial plexus  Laterality: right  Injection technique: single-shot  Procedures: ultrasound guided  Local infiltration: lidocaine  Infiltration strength: 1 %  Dose: 1 mL  Supraclavicular  Provider prep: mask and sterile gloves  Local infiltration: lidocaine  Needle  Needle gauge: 21 G  Needle length: 10 cm  Needle localization: ultrasound guidance  Assessment  Injection assessment: negative aspiration for heme, no paresthesia on injection and local visualized surrounding nerve on ultrasound  Paresthesia pain: none  Slow fractionated injection: yes  Hemodynamics: stable  Additional Notes  Immediately prior to procedure a \"time out\" was called to verify the correct patient, allergies, laterality, procedure and equipment. Time out performed with len GREEN and [facility] CRNA @ 3150  Local Anesthetic: 0.5 %  Bupivacaine   Amount: 20 ml  in 5 ml increments after negative aspiration each time.             Reason for block: post-op pain management and at surgeon's request

## 2019-05-30 NOTE — ANESTHESIA PRE PROCEDURE
SpO2: 100%   Weight: 189 lb 9.5 oz (86 kg)   Height: 6' (1.829 m)                                              BP Readings from Last 3 Encounters:   05/30/19 (!) 128/95   05/13/19 (!) 140/88       NPO Status: Time of last liquid consumption: 0200                        Time of last solid consumption: 2355                        Date of last liquid consumption: 05/30/19                        Date of last solid food consumption: 05/29/19    BMI:   Wt Readings from Last 3 Encounters:   05/30/19 189 lb 9.5 oz (86 kg)   05/22/19 188 lb 15 oz (85.7 kg)   05/13/19 189 lb (85.7 kg)     Body mass index is 25.71 kg/m². CBC:   Lab Results   Component Value Date    WBC 4.5 05/13/2019    RBC 4.78 05/13/2019    HGB 12.6 05/13/2019    HCT 41.5 05/13/2019    MCV 86.8 05/13/2019    RDW 15.1 05/13/2019     05/13/2019       CMP:   Lab Results   Component Value Date     05/13/2019    K 3.4 05/13/2019     05/13/2019    CO2 23 05/13/2019    BUN 18 05/13/2019    CREATININE 1.21 05/13/2019    GFRAA >60 05/13/2019    LABGLOM >60 05/13/2019    GLUCOSE 95 05/13/2019    PROT 7.8 05/13/2019    CALCIUM 9.2 05/13/2019    BILITOT 0.34 05/13/2019    ALKPHOS 65 05/13/2019    AST 18 05/13/2019    ALT 13 05/13/2019       POC Tests: No results for input(s): POCGLU, POCNA, POCK, POCCL, POCBUN, POCHEMO, POCHCT in the last 72 hours.     Coags:   Lab Results   Component Value Date    PROTIME 10.7 05/13/2019    INR 1.0 05/13/2019    APTT 24.7 05/13/2019       HCG (If Applicable): No results found for: PREGTESTUR, PREGSERUM, HCG, HCGQUANT     ABGs: No results found for: PHART, PO2ART, JAG0FAL, FAU8YIK, BEART, C8AFCXWS     Type & Screen (If Applicable):  No results found for: LABABO, LABRH    Anesthesia Evaluation   no history of anesthetic complications:   Airway: Mallampati: I     Neck ROM: full  Mouth opening: > = 3 FB Dental:          Pulmonary:       (-) COPD, asthma, shortness of breath, sleep apnea and stridor                           Cardiovascular:        (-) pacemaker, past MI, CABG/stent,  angina and  CHF        Rate: normal                    Neuro/Psych:      (-) seizures, TIA and CVA           GI/Hepatic/Renal:        (-) GERD, liver disease and no renal disease       Endo/Other:        (-) diabetes mellitus, hypothyroidism, hyperthyroidism, blood dyscrasia               Abdominal:       Abdomen: soft. Vascular:                                        Anesthesia Plan      general and regional     ASA 2       Induction: intravenous. Anesthetic plan and risks discussed with patient and spouse (companions).                       Kirstie Pabon MD   5/30/2019

## 2019-05-30 NOTE — BRIEF OP NOTE
Brief Postoperative Note  ______________________________________________________________    Patient: Zachary Easton  YOB: 1980  MRN: [id number]  Date of Procedure: 5/30/2019    Pre-Op Diagnosis: RIGHT DISTAL RADIUS FRACTURE    Post-Op Diagnosis: Same       Procedure(s):  OPEN REDUCTION INTERNAL FIXATION RIGHT DISTAL RADIUS  USE OF INTRAOPERATIVE FLUOROSCOPY    Anesthesia: Regional, General    Surgeon(s):  Allie Solano MD    Assistant: Leonila Kam DO PGY-5; Rasta Cortez DO PGY-3    Estimated Blood Loss (mL): 5    Fluids: 1000 cc crystalloid    Tourniquet Time: 90 minutes    Complications: None    Specimens:   * No specimens in log *    Implants:  Implant Name Type Inv.  Item Serial No.  Lot No. LRB No. Used   PLATE 3.3QJ VOLAR RIM RT Screw/Plate/Nail/Regis PLATE 7.0IW VOLAR RIM RT  SYNTHES  Right 1   SCREW LK SAMANTHA ANGL 2.4X20MM Screw/Plate/Nail/Regis SCREW LK SAMANTHA ANGL 2.4X20MM  SYNTHES  Right 3   SCREW LK STARDRIVE VA 9.9DP 35OM Screw/Plate/Nail/Regis SCREW LK STARDRIVE VA 2.9KA 48AT  SYNTHES  Right 2   SCREW CORTX SLFTP W/ T8 2.4X24MM Screw/Plate/Nail/Regis SCREW CORTX SLFTP W/ T8 2.4X24MM  SYNTHES  Right 1   SCREW CORTCL LK SLFTP T8 2.7X16MM Screw/Plate/Nail/Regis SCREW CORTCL LK SLFTP T8 2.7X16MM  SYNTHES  Right 2   SCREW CORTCL LK SLFTP T8 2.7X18MM Screw/Plate/Nail/Regis SCREW CORTCL LK SLFTP T8 2.7X18MM  SYNTHES  Right 1   k-wire 0.062       Right 2     Drains: * No LDAs found *    Findings: comminuted, multi-part intra-articular right distal radius fracture; see Op Note for details    Olivia Madden DO  Date: 5/30/2019  Time: 3:20 PM

## 2019-05-30 NOTE — PROGRESS NOTES
Discharge instructions given to the patient, wife, mother and family and they all verbalized understanding. Paper discharge instructions given to the patient's wife.

## 2019-05-30 NOTE — DISCHARGE INSTR - OTHER ORDERS
naproxen if you are allergic to it, or if you have ever had an asthma attack or severe allergic reaction after taking aspirin or an NSAID. Ask a doctor or pharmacist if it is safe for you to use this medicine if you have:  · heart disease, high blood pressure, high cholesterol, diabetes, or if you smoke;  · a history of heart attack, stroke, or blood clot;  · a history of stomach ulcers or bleeding;  · asthma;  · liver or kidney disease; or  · fluid retention. Taking naproxen during the last 3 months of pregnancy may harm the unborn baby. Ask a doctor before using this medicine if you are pregnant. Naproxen may interfere with ovulation, causing temporary infertility. Naproxen can pass into breast milk and may cause side effects in the nursing baby. You should not breast-feed while using this medicine. Naproxen is not approved for use by anyone younger than 3years old. Do not give this medicine to a child without medical advice. How should I take naproxen? Use exactly as directed on the label, or as prescribed by your doctor. Do not take this medicine in larger amounts or for longer than recommended. Use the lowest dose that is effective in treating your condition. Do not crush, chew, or break a naproxen tablet. Swallow it whole. Shake the oral suspension (liquid) well just before you measure a dose. Measure liquid medicine with the dosing syringe provided, or with a special dose-measuring spoon or medicine cup. If you do not have a dose-measuring device, ask your pharmacist for one. If you change brands, strengths, or forms of naproxen, your dosage needs may change. Ask your pharmacist if you have any questions about the kind of naproxen you are using. If a child is using this medicine, tell your doctor if the child has any changes in weight. Naproxen doses are based on weight in children, and any changes may affect your child's dose.   If you use naproxen long-term, you may need frequent medical tests.  This medicine can cause unusual results with certain medical tests. Tell any doctor who treats you that you are using naproxen. Store at room temperature away from moisture, heat, and light. Keep the bottle tightly closed when not in use. Read all patient information, medication guides, and instruction sheets provided to you. Ask your doctor or pharmacist if you have any questions. What happens if I miss a dose? Since naproxen is sometimes used only when needed, you may not be on a dosing schedule. If you are on a schedule, use the missed dose as soon as you remember. Skip the missed dose if it is almost time for your next scheduled dose. Do not use extra medicine to make up the missed dose. What happens if I overdose? Seek emergency medical attention or call the Poison Help line at 1-145.350.3416. What should I avoid while taking naproxen? Avoid drinking alcohol. It may increase your risk of stomach bleeding. Avoid taking aspirin while you are taking naproxen. Ask your doctor before taking any other medication for pain, arthritis, fever, or swelling. Many medicines available over the counter contain aspirin, salicylates, or other medicines similar to naproxen (such as ibuprofen or ketoprofen). Taking certain products together can cause you to get too much of this type of medication. Ask your doctor before using an antacid, and use only the type your doctor recommends. Some antacids can make it harder for your body to absorb naproxen. What are the possible side effects of naproxen? Get emergency medical help if you have signs of an allergic reaction: sneezing, runny or stuffy nose; wheezing or trouble breathing; hives; swelling of your face, lips, tongue, or throat. Get emergency medical help if you have signs of a heart attack or stroke: chest pain spreading to your jaw or shoulder, sudden numbness or weakness on one side of the body, slurred speech, feeling short of breath.   Stop using naproxen and call your doctor at once if you have:  · shortness of breath (even with mild exertion); · swelling or rapid weight gain;  · the first sign of any skin rash, no matter how mild;  · signs of stomach bleeding --bloody or tarry stools, coughing up blood or vomit that looks like coffee grounds;  · liver problems --nausea, upper stomach pain, itching, tired feeling, flu-like symptoms, loss of appetite, dark urine, peng-colored stools, jaundice (yellowing of the skin or eyes);  · kidney problems --little or no urinating, painful or difficult urination, swelling in your feet or ankles, feeling tired or short of breath;  · low red blood cells (anemia) --pale skin, feeling light-headed or short of breath, rapid heart rate, trouble concentrating; or  · severe skin reaction --fever, sore throat, swelling in your face or tongue, burning in your eyes, skin pain followed by a red or purple skin rash that spreads (especially in the face or upper body) and causes blistering and peeling. Common side effects may include:  · indigestion, heartburn, stomach pain, nausea;  · headache, dizziness, drowsiness;  · bruising, itching, rash;  · swelling; or  · ringing in your ears. This is not a complete list of side effects and others may occur. Call your doctor for medical advice about side effects. You may report side effects to FDA at 2-830-FDA-9648. What other drugs will affect naproxen? Ask your doctor before using naproxen if you take an antidepressant such as citalopram, escitalopram, fluoxetine (Prozac), fluvoxamine, paroxetine, sertraline (Zoloft), trazodone, or vilazodone. Taking any of these medicines with an NSAID may cause you to bruise or bleed easily.   Ask a doctor or pharmacist if it is safe for you to use naproxen if you are also using any of the following drugs:  · cholestyramine;  · cyclosporine;  · digoxin;  · lithium;  · methotrexate;  · pemetrexed;  · phenytoin or similar seizure all possible uses, directions, precautions, warnings, drug interactions, allergic reactions, or adverse effects. If you have questions about the drugs you are taking, check with your doctor, nurse or pharmacist.  Copyright 5862-6788 [address]: . Revision date: 3/14/2017. Care instructions adapted under license by [facility] ([facility]). If you have questions about a medical condition or this instruction, always ask your healthcare professional. [place] [address] any warranty or liability for your use of this information. acetaminophen and oxycodone  Pronunciation:  a SEET a MIN oh fen and OX i KOE done  Brand:  Endocet 10/325, Endocet 2.5/325, Endocet 5/325, Endocet 7.5/325, Nalocet, Percocet 10/325, Percocet 2.5/325, Percocet 5/325, Percocet 7.5/325, Primalev, Primlev, Roxicet, Xartemis XR  What is the most important information I should know about acetaminophen and oxycodone? MISUSE OF OPIOID MEDICINE CAN CAUSE ADDICTION, OVERDOSE, OR DEATH. Keep the medication in a place where others cannot get to it. An overdose of acetaminophen can damage your liver or cause death. Call your doctor at once if you have pain in your upper stomach, loss of appetite, dark urine, or jaundice (yellowing of your skin or eyes). Taking opioid medicine during pregnancy may cause life-threatening withdrawal symptoms in the . Fatal side effects can occur if you use opioid medicine with alcohol, or with other drugs that cause drowsiness or slow your breathing. Stop taking this medicine and call your doctor right away if you have skin redness or a rash that spreads and causes blistering and peeling. What is acetaminophen and oxycodone? Oxycodone is an opioid pain medication, sometimes called a narcotic. Acetaminophen is a less potent pain reliever that increases the effects of oxycodone.   Acetaminophen and oxycodone is a combination medicine used to relieve moderate to severe pain.  Acetaminophen and oxycodone may also be used for purposes not listed in this medication guide. What should I discuss with my healthcare provider before taking acetaminophen and oxycodone? You should not use this medicine if you are allergic to acetaminophen or oxycodone, or if you have:  · severe asthma or breathing problems; or  · a blockage in your stomach or intestines. Tell your doctor if you have ever had:  · liver disease;  · a drug or alcohol addiction;  · kidney disease;  · a head injury or seizures;  · urination problems; or  · problems with your thyroid, pancreas, or gallbladder. If you use opioid medicine while you are pregnant, your baby could become dependent on the drug. This can cause life-threatening withdrawal symptoms in the baby after it is born. Babies born dependent on opioids may need medical treatment for several weeks. Do not breast-feed. This medicine can pass into breast milk and cause drowsiness, breathing problems, or death in a nursing baby. How should I take acetaminophen and oxycodone? Follow all directions on your prescription label. Never take this medicine in larger amounts, or for longer than prescribed. An overdose can damage your liver or cause death. Tell your doctor if the medicine seems to stop working as well in relieving your pain. Never share this medicine with another person, especially someone with a history of drug abuse or addiction. MISUSE CAN CAUSE ADDICTION, OVERDOSE, OR DEATH. Keep the medicine in a place where others cannot get to it. Selling or giving away acetaminophen and oxycodone is against the law. Measure liquid medicine carefully. Use the dosing syringe provided, or use a medicine dose-measuring device (not a kitchen spoon). If you need surgery or medical tests, tell the doctor ahead of time that you are using this medicine. You should not stop using this medicine suddenly. Follow your doctor's instructions about tapering your dose.   Store at room temperature away from moisture and heat. Keep track of your medicine. You should be aware if anyone is using it improperly or without a prescription. Do not keep leftover opioid medication. Just one dose can cause death in someone using this medicine accidentally or improperly. Ask your pharmacist where to locate a drug take-back disposal program. If there is no take-back program, flush the unused medicine down the toilet. What happens if I miss a dose? Since this medicine is used for pain, you are not likely to miss a dose. Skip any missed dose if it is almost time for your next dose. Do not use two doses at one time. What happens if I overdose? Seek emergency medical attention or call the Poison Help line at 1-954.443.8289. An overdose of acetaminophen and oxycodone can be fatal.   The first signs of an acetaminophen overdose include loss of appetite, nausea, vomiting, stomach pain, sweating, and confusion or weakness. Later symptoms may include pain in your upper stomach, dark urine, and yellowing of your skin or the whites of your eyes. Overdose can also cause severe muscle weakness, pinpoint pupils, very slow breathing, extreme drowsiness, or coma. What should I avoid while taking acetaminophen and oxycodone? Avoid driving or operating machinery until you know how this medicine will affect you. Dizziness or drowsiness can cause falls, accidents, or severe injuries. Do not drink alcohol. Dangerous side effects or death could occur. Ask a doctor or pharmacist before using any other medicine that may contain acetaminophen (sometimes abbreviated as APAP). Taking certain medications together can lead to a fatal overdose. What are the possible side effects of acetaminophen and oxycodone? Get emergency medical help if you have signs of an allergic reaction: hives; difficulty breathing; swelling of your face, lips, tongue, or throat.   Opioid medicine can slow or stop your breathing, and death may occur. A person caring for you should seek emergency medical attention if you have slow breathing with long pauses, blue colored lips, or if you are hard to wake up. In rare cases, acetaminophen may cause a severe skin reaction that can be fatal. This could occur even if you have taken acetaminophen in the past and had no reaction. Stop taking this medicine and call your doctor right away if you have skin redness or a rash that spreads and causes blistering and peeling. Call your doctor at once if you have:  · noisy breathing, sighing, shallow breathing;  · a light-headed feeling, like you might pass out;  · weakness, tiredness, fever, unusual bruising or bleeding;  · confusion, unusual thoughts or behavior;  · problems with urination;  · liver problems --nausea, upper stomach pain, tiredness, loss of appetite, dark urine, peng-colored stools, jaundice (yellowing of the skin or eyes); or  · low cortisol levels -- nausea, vomiting, loss of appetite, dizziness, worsening tiredness or weakness. Seek medical attention right away if you have symptoms of serotonin syndrome, such as: agitation, hallucinations, fever, sweating, shivering, fast heart rate, muscle stiffness, twitching, loss of coordination, nausea, vomiting, or diarrhea. Serious side effects may be more likely in older adults and those who are overweight, malnourished, or debilitated. Long-term use of opioid medication may affect fertility (ability to have children) in men or women. It is not known whether opioid effects on fertility are permanent. Common side effects include:  · dizziness, drowsiness, feeling tired;  · feelings of extreme happiness or sadness;  · nausea, vomiting, stomach pain;  · constipation; or  · headache. This is not a complete list of side effects and others may occur. Call your doctor for medical advice about side effects. You may report side effects to FDA at 3-021-FDA-3681.   What other drugs will affect acetaminophen and oxycodone? You may have breathing problems or withdrawal symptoms if you start or stop taking certain other medicines. Tell your doctor if you also use an antibiotic, antifungal medication, heart or blood pressure medication, seizure medication, or medicine to treat HIV or hepatitis C. Opioid medication can interact with many other drugs and cause dangerous side effects or death. Be sure your doctor knows if you also use:  · cold or allergy medicines, bronchodilator asthma/COPD medication, or a diuretic (\"water pill\");  · medicines for motion sickness, irritable bowel syndrome, or overactive bladder;  · other narcotic medications --opioid pain medicine or prescription cough medicine;  · a sedative like Valium --diazepam, alprazolam, lorazepam, Xanax, Klonopin, Versed, and others;  · drugs that make you sleepy or slow your breathing --a sleeping pill, muscle relaxer, medicine to treat mood disorders or mental illness;  · drugs that affect serotonin levels in your body --a stimulant, or medicine for depression, Parkinson's disease, migraine headaches, serious infections, or nausea and vomiting. This list is not complete. Other drugs may affect acetaminophen and oxycodone, including prescription and over-the-counter medicines, vitamins, and herbal products. Not all possible interactions are listed here. Where can I get more information? Your doctor or pharmacist can provide more information about acetaminophen and oxycodone. Remember, keep this and all other medicines out of the reach of children, never share your medicines with others, and use this medication only for the indication prescribed. Every effort has been made to ensure that the information provided by [address] is accurate, up-to-date, and complete, but no guarantee is made to that effect. Drug information contained herein may be time sensitive.  [facility] information has been compiled for use by healthcare practitioners and consumers in the [place] and therefore [employer] does not warrant that uses outside of the [place] are appropriate, unless specifically indicated otherwise. [employer]'s drug information does not endorse drugs, diagnose patients or recommend therapy. "[employer]s drug information is an informational resource designed to assist licensed healthcare practitioners in caring for their patients and/or to serve consumers viewing this service as a supplement to, and not a substitute for, the expertise, skill, knowledge and judgment of healthcare practitioners. The absence of a warning for a given drug or drug combination in no way should be construed to indicate that the drug or drug combination is safe, effective or appropriate for any given patient. [facility] does not assume any responsibility for any aspect of healthcare administered with the aid of information [facility] provides. The information contained herein is not intended to cover all possible uses, directions, precautions, warnings, drug interactions, allergic reactions, or adverse effects. If you have questions about the drugs you are taking, check with your doctor, nurse or pharmacist.  Copyright 0848-4402 [address]. Version: 18.02. Revision date: 11/28/2018. Care instructions adapted under license by [facility] ([facility]). If you have questions about a medical condition or this instruction, always ask your healthcare professional. [place] [address] any warranty or liability for your use of this information.

## 2019-05-30 NOTE — H&P
Surgical H&P    Reason for surgery:  The patient is a 42 y.o. LHD  male with history of recent fall from significant height, approximately 15 feet off ladder, with comminuted intra-articular right distal radius fracture. Past Medical History:    History reviewed. No pertinent past medical history. Past Surgical History:    History reviewed. No pertinent surgical history. Medications Prior to Admission:   Prior to Admission medications    Medication Sig Start Date End Date Taking? Authorizing Provider   naproxen (NAPROSYN) 500 MG tablet Take 1 tablet by mouth 2 times daily (with meals) for 30 doses 5/13/19 5/28/19 Yes Ena Lima MD     Allergies:    Patient has no known allergies.     Social History:   Social History     Socioeconomic History    Marital status:      Spouse name: None    Number of children: None    Years of education: None    Highest education level: None   Occupational History    None   Social Needs    Financial resource strain: None    Food insecurity:     Worry: None     Inability: None    Transportation needs:     Medical: None     Non-medical: None   Tobacco Use    Smoking status: Never Smoker    Smokeless tobacco: Never Used   Substance and Sexual Activity    Alcohol use: Yes     Comment: socially    Drug use: Yes     Types: Marijuana     Comment: pt denies marijuana in preop    Sexual activity: Yes   Lifestyle    Physical activity:     Days per week: None     Minutes per session: None    Stress: None   Relationships    Social connections:     Talks on phone: None     Gets together: None     Attends Holiness service: None     Active member of club or organization: None     Attends meetings of clubs or organizations: None     Relationship status: None    Intimate partner violence:     Fear of current or ex partner: None     Emotionally abused: None     Physically abused: None     Forced sexual activity: None   Other Topics Concern    None   Social History Narrative    None     Family History:  History reviewed. No pertinent family history. REVIEW OF SYSTEMS:  General: no fevers or chills  CV: no chest pain  Resp: no SOB  EXT/MSK: right wrist pain  ROS negative other than stated above    PHYSICAL EXAM:  BP (!) 128/95   Pulse 75   Temp 97.7 °F (36.5 °C) (Tympanic)   Resp 18   Ht 6' (1.829 m)   Wt 189 lb 9.5 oz (86 kg)   SpO2 100%   BMI 25.71 kg/m²   Gen: alert and oriented, NAD, cooperative  Head: normocephalic, atraumatic   Cardiovascular: Regular rate, no dependent edema, distal pulses 2+  Respiratory: Chest symmetric, no accessory muscle use, normal respirations  MSK-RUE: Splint in place to extremity, c/d/i. No signs of skin irritation. Compartments soft and compressible. Hand warm and perfused w/ BCR. Median/Radial/Ulnar/AIN/PIN gross motor intact. Median/Radial/Ulnar nerve SILT.     A/P: 42 y.o. male being seen for closed, comminuted right intra-articular distal radius fracture    - OR today for ORIF right radius  - NPO since MN  - ABx OCTOR  - Consent signed, patient marked    Helena Cherry DO  PGY-3 Orthopedic Surgery  11:20 AM 5/30/2019

## 2019-05-31 NOTE — OP NOTE
[facility]                             OPERATIVE REPORT    PATIENT NAME: Carlos Eduardo Birch                       :         1980  MED REC NO:   [id number]                             ROOM:  ACCOUNT NO:   [de-identified]                           ADMIT DATE:  2019  PROVIDER:     Evan Saravia    DATE OF PROCEDURE:  2019    PREOPERATIVE DIAGNOSIS:  Comminuted intraarticular right distal  radius fracture. POSTOPERATIVE DIAGNOSIS:  Comminuted intraarticular right distal  radius fracture. PROCEDURE:  Open reduction and internal fixation using a Synthes  distal radial rim locking plate. SURGEON:  Evan Quinteros. 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 The patient fell,  sustained an intraarticular comminuted distal radius fracture. Elected to proceed with operative fixation. The patient  understands the risks of procedure including infection requiring  operative irrigation and debridement. He understands that we would  expect him to have issues from his prior injury as well as further  issues from this fracture including loss of motion, development of  posttraumatic arthritis. He understands the risk of damage to  neurovascular structures as well as risk of anesthesia. OPERATIVE NOTE:  The patient was taken to the operating room on  2019, placed supine on the OR table. Anesthesia was induced  via general endotracheal intubation. He was given 2 gm of Ancef IV  for prophylaxis. A well-padded tourniquet was applied to the right  upper arm. The right arm and hand was then prepped and draped in  usual sterile fashion. The arm was exsanguinated with an Esmarch  bandage, and tourniquet inflated to 200 mmHg.  Incision was then  made centered over the FCR tendon. This started at the wrist  crease and carried proximally. This was carefully cut with a 15  blade. Blunt dissection dissected through subcu tissue down to the  FCR tendon. Tendon sheath was then opened and the tendon  retracted. The base of the sheath was then opened. This allowed  blunt dissection with a finger retracting the flexor pollicis  longus muscle belly and tendon which allowed access directly down  to the radius and the pronator quadratus. The pronator was lifted  radially and distally. This allowed visualization of the fracture. Fracture was found to be intraarticular, very comminuted and  extended distally. Fracture site was cleaned. We could then  provisionally reduce it and hold it with both the styloid pin and a  crossing pin. We got back good radial length. We got back good  volar tilt. We got back most of the radial inclination. Because  the fracture was very distal, elected to use a rim plate. This was  provisionally pinned and checked position. We then placed the screws distally which were locking screws, first  using a standard cortical screw to suck the plate down to bone and  then used cortical screws proximally. We did again obtain a good  reduction. 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 Final images confirmed good reduction and  placement of the hardware. Wound was irrigated. Deep tissue  closed with 0 Vicryl, subcu tissue with 2-0 Vicryl, skin with  Monocryl and then Dermabond. Sterile compressive dressing was  applied and a volar splint. The patient was allowed to emerge from  general anesthesia. He was extubated while in the operating room  and taken to recovery room in stable and good condition.         Tobias Magana    D: 05/30/2019 15:09:46       T: 05/31/2019 3:08:53     [email or web address]  Job#: [id number]     Doc#: [id number]    CC:

## 2019-06-03 DIAGNOSIS — S52.591A OTHER CLOSED FRACTURE OF DISTAL END OF RIGHT RADIUS, INITIAL ENCOUNTER: Primary | ICD-10-CM

## 2019-06-04 ENCOUNTER — TELEPHONE (OUTPATIENT)
Dept: ORTHOPEDIC SURGERY | Age: 39
End: 2019-06-04

## 2019-06-05 RX ORDER — OXYCODONE HYDROCHLORIDE AND ACETAMINOPHEN 5; 325 MG/1; MG/1
1 TABLET ORAL EVERY 6 HOURS PRN
Qty: 28 TABLET | Refills: 0 | Status: SHIPPED | OUTPATIENT
Start: 2019-06-05 | End: 2019-06-12

## 2019-06-10 DIAGNOSIS — S52.591A OTHER CLOSED FRACTURE OF DISTAL END OF RIGHT RADIUS, INITIAL ENCOUNTER: Primary | ICD-10-CM

## 2019-06-12 ENCOUNTER — OFFICE VISIT (OUTPATIENT)
Dept: ORTHOPEDIC SURGERY | Age: 39
End: 2019-06-12

## 2019-06-12 VITALS — WEIGHT: 189.6 LBS | BODY MASS INDEX: 25.68 KG/M2 | HEIGHT: 72 IN

## 2019-06-12 DIAGNOSIS — G89.18 POST-OP PAIN: ICD-10-CM

## 2019-06-12 DIAGNOSIS — S52.591A OTHER CLOSED FRACTURE OF DISTAL END OF RIGHT RADIUS, INITIAL ENCOUNTER: Primary | ICD-10-CM

## 2019-06-12 PROCEDURE — 99024 POSTOP FOLLOW-UP VISIT: CPT | Performed by: STUDENT IN AN ORGANIZED HEALTH CARE EDUCATION/TRAINING PROGRAM

## 2019-06-12 RX ORDER — OXYCODONE HYDROCHLORIDE AND ACETAMINOPHEN 5; 325 MG/1; MG/1
1 TABLET ORAL EVERY 6 HOURS PRN
Qty: 20 TABLET | Refills: 0 | Status: SHIPPED | OUTPATIENT
Start: 2019-06-12 | End: 2019-06-17

## 2019-06-16 NOTE — PROGRESS NOTES
[address] [place] [address]  Dept: [phone]  Dept Fax: [phone]        Postoperative follow-up note    Subjective:     Chief Complaint   Patient presents with    Post-Op Check     right wrist       HPI:  Scarlet Moreno is a 42y.o. year old male who presents to our office today for postoperative followup regarding his right distal radius fracture s/p ORIF on 5/30/19    Patient has been doing well since surgery. Denies any issues. Has remained in splint. Denies fevers, chills, chest pain, shortness of breath. Pain is controlled. Review of systems:  Constitutional: Negative for fever and chills. Respiratory: Negative for cough, shortness of breath and wheezing. Cardiovascular: Negative for chest pain and palpitations. GI: Denies any nausea, vomiting, abdominal pain   Musculoskeletal: Positive for mild right wrist pain    Objective :   Ht 6' 0.01\" (1.829 m)   Wt 189 lb 9.5 oz (86 kg)   BMI 25.71 kg/m²     General: Scarlet Moreno is a 42 y.o. male who is alert and oriented and sitting comfortably in our office. Neuro: alert. oriented  Eyes: Extra-ocular muscles intact  Mouth: Oral mucosa moist. No perioral lesions  Pulm: Respirations unlabored and regular. Skin: warm, well perfused  Psych:   Patient has good fund of knowledge and displays understanging of exam, diagnosis, and plan. MSK: RUE: splint to RUE taken down to examine incision. Pin in place without evidence of surrounding erythema or discharge. No evidence of infection. Surgical incision healed with skin glue still in place. Mile TTP around wrist as expected. Radiology:   History:   S/p R wrist ORIF. See above    Findings:   Views: 3 viewsR wrist  Findings: demonstrate comminuted distal radius intraarticular fracture s/p ORIF with orthopedic hardware in place. No evidence of hardware loosening or fracture displacement. No additional osseous abnormalities noted.    Previous comparison films: 5/30/19 post operative R wrist XR    Impression:  1. R wrist fracture s/p ORIF       Assessment:      1. Other closed fracture of distal end of right radius, initial encounter    2. Post-op pain         Plan:   Splint replaced in clinic today. Patient is to remain in splint for an additional 1-2 weeks at which point the plan will be to remove the pin and let him begin moving the wrist. Repeat XR at next clinic visit. Follow up:No follow-ups on file. Orders Placed This Encounter   Medications    oxyCODONE-acetaminophen (PERCOCET) 5-325 MG per tablet     Sig: Take 1 tablet by mouth every 6 hours as needed for Pain for up to 5 days. Intended supply: 5 days. Take lowest dose possible to manage pain     Dispense:  20 tablet     Refill:  0     Reduce doses taken as pain becomes manageable       No orders of the defined types were placed in this encounter. --------------------------------------------------------------  Tyrone Francisco DO, PGY-2  [facility]) Orthopedic Surgery   11:34 AM 06/16/19      Please excuse any typos/errors, as this note was created with the assistance of voice recognition software. While intending to generate a document that actually reflects the content of the visit, the document can still have some errors including those of syntax and sound-a-like substitutions which may escape proof reading. In such instances, actual meaning can be extrapolated by context.

## 2019-06-18 ENCOUNTER — OFFICE VISIT (OUTPATIENT)
Dept: ORTHOPEDIC SURGERY | Age: 39
End: 2019-06-18

## 2019-06-18 VITALS — HEIGHT: 72 IN | BODY MASS INDEX: 27.09 KG/M2 | WEIGHT: 200 LBS

## 2019-06-18 DIAGNOSIS — G89.18 POST-OP PAIN: Primary | ICD-10-CM

## 2019-06-18 DIAGNOSIS — S52.591A OTHER CLOSED FRACTURE OF DISTAL END OF RIGHT RADIUS, INITIAL ENCOUNTER: Primary | ICD-10-CM

## 2019-06-18 DIAGNOSIS — G89.18 POST-OP PAIN: ICD-10-CM

## 2019-06-18 DIAGNOSIS — S52.591A OTHER CLOSED FRACTURE OF DISTAL END OF RIGHT RADIUS, INITIAL ENCOUNTER: ICD-10-CM

## 2019-06-18 PROCEDURE — 99024 POSTOP FOLLOW-UP VISIT: CPT | Performed by: ORTHOPAEDIC SURGERY

## 2019-06-18 RX ORDER — HYDROCODONE BITARTRATE AND ACETAMINOPHEN 5; 325 MG/1; MG/1
1 TABLET ORAL EVERY 6 HOURS PRN
Qty: 28 TABLET | Refills: 0 | Status: SHIPPED | OUTPATIENT
Start: 2019-06-18 | End: 2019-06-25

## 2019-06-18 RX ORDER — HYDROCODONE BITARTRATE AND ACETAMINOPHEN 5; 325 MG/1; MG/1
1 TABLET ORAL EVERY 4 HOURS PRN
Qty: 30 TABLET | Refills: 0 | Status: SHIPPED | OUTPATIENT
Start: 2019-06-18 | End: 2019-06-23

## 2019-06-18 NOTE — PROGRESS NOTES
Subjective:      Patient ID: Chandan Diaz is a 42 y.o. male. HPI  Patient is now 3 weeks out ORIF left distal radius fracture. Current Outpatient Medications   Medication Sig Dispense Refill    HYDROcodone-acetaminophen (NORCO) 5-325 MG per tablet Take 1 tablet by mouth every 4 hours as needed for Pain for up to 5 days. Intended supply: 5 days. Take lowest dose possible to manage pain 30 tablet 0    HYDROcodone-acetaminophen (NORCO) 5-325 MG per tablet Take 1 tablet by mouth every 6 hours as needed for Pain for up to 7 days. 28 tablet 0    naproxen (NAPROSYN) 500 MG tablet Take 1 tablet by mouth 2 times daily (with meals) for 30 doses 60 tablet 0     No current facility-administered medications for this visit. Review of Systems  No past medical history on file. Past Surgical History:   Procedure Laterality Date    ARM FRACTURE SURGERY Right 5/30/2019    RIGHT DISTAL OPEN REDUCTION INTERNAL FIXATION / NEED, C-ARM, SYNTHESIS AND HAND TABLE performed by Rama Solis MD at [place] [address]     No family history on file. Social History     Tobacco Use    Smoking status: Never Smoker    Smokeless tobacco: Never Used   Substance Use Topics    Alcohol use: Yes     Comment: socially    Drug use: Yes     Types: Marijuana     Comment: pt denies marijuana in preop       Objective:     Vitals:    06/18/19 1445   Weight: 200 lb (90.7 kg)   Height: 6' (1.829 m)     Physical Exam  On exam incisions are healing well. Pin sites look good. Minimal swelling. Radiology:            Impression:        Assessment:     Visit Diagnoses       Codes    Post-op pain    -  Primary G89.18           Plan:      We replaced him in a short arm cast.  Like to see him back in 2 weeks cast removed x-rays out of the cast and will plan on removing the pins at that time     Please be aware that portions of this chart note were created using voice recognition software and that unforseen errors may have occured   Electronically signed by Alina Pearson MD on 6/18/2019 at 3:24 PM

## 2019-06-18 NOTE — TELEPHONE ENCOUNTER
Patient would like a refill on pain medication. DOS:5/30/2019-Open reduction and internal fixation using a Synthes  distal radial rim locking plate.  (Clinic patient)

## 2019-06-20 DIAGNOSIS — S52.591A OTHER CLOSED FRACTURE OF DISTAL END OF RIGHT RADIUS, INITIAL ENCOUNTER: Primary | ICD-10-CM

## 2019-06-24 ENCOUNTER — TELEPHONE (OUTPATIENT)
Dept: ORTHOPEDIC SURGERY | Age: 39
End: 2019-06-24

## 2019-06-26 DIAGNOSIS — S52.591A OTHER CLOSED FRACTURE OF DISTAL END OF RIGHT RADIUS, INITIAL ENCOUNTER: Primary | ICD-10-CM

## 2019-06-26 RX ORDER — HYDROCODONE BITARTRATE AND ACETAMINOPHEN 5; 325 MG/1; MG/1
1 TABLET ORAL EVERY 6 HOURS PRN
Qty: 28 TABLET | Refills: 0 | Status: SHIPPED | OUTPATIENT
Start: 2019-06-26 | End: 2019-07-03

## 2019-06-26 NOTE — TELEPHONE ENCOUNTER
Patient last seen on 6/18/19 for post op follow up of left wrist ORIF. DOS: 5/30/19. The patient is requesting a refill on his pain medication. The patient was last prescribed Norco 5-325 mg Q6 hours PRN. Please advise, thank you.

## 2019-07-03 ENCOUNTER — OFFICE VISIT (OUTPATIENT)
Dept: ORTHOPEDIC SURGERY | Age: 39
End: 2019-07-03

## 2019-07-03 VITALS — HEIGHT: 72 IN | WEIGHT: 199.96 LBS | BODY MASS INDEX: 27.08 KG/M2

## 2019-07-03 DIAGNOSIS — S52.571A OTHER CLOSED INTRA-ARTICULAR FRACTURE OF DISTAL END OF RIGHT RADIUS, INITIAL ENCOUNTER: Primary | ICD-10-CM

## 2019-07-03 PROCEDURE — 99024 POSTOP FOLLOW-UP VISIT: CPT | Performed by: STUDENT IN AN ORGANIZED HEALTH CARE EDUCATION/TRAINING PROGRAM

## 2020-09-06 ENCOUNTER — APPOINTMENT (OUTPATIENT)
Dept: CT IMAGING | Age: 40
End: 2020-09-06
Payer: MEDICARE

## 2020-09-06 ENCOUNTER — HOSPITAL ENCOUNTER (OUTPATIENT)
Age: 40
Setting detail: OBSERVATION
Discharge: HOME | End: 2020-09-08
Attending: EMERGENCY MEDICINE | Admitting: EMERGENCY MEDICINE
Payer: MEDICARE

## 2020-09-06 PROBLEM — K12.2 ORAL ABSCESS: Status: ACTIVE | Noted: 2020-09-06

## 2020-09-06 LAB
ABSOLUTE EOS #: <0.03 K/UL (ref 0–0.44)
ABSOLUTE IMMATURE GRANULOCYTE: 0.08 K/UL (ref 0–0.3)
ABSOLUTE LYMPH #: 0.97 K/UL (ref 1.1–3.7)
ABSOLUTE MONO #: 0.84 K/UL (ref 0.1–1.2)
ALBUMIN SERPL-MCNC: 4.2 G/DL (ref 3.5–5.2)
ALBUMIN/GLOBULIN RATIO: 1.1 (ref 1–2.5)
ALP BLD-CCNC: 86 U/L (ref 40–129)
ALT SERPL-CCNC: 22 U/L (ref 5–41)
ANION GAP SERPL CALCULATED.3IONS-SCNC: 13 MMOL/L (ref 9–17)
AST SERPL-CCNC: 21 U/L
BASOPHILS # BLD: 0 % (ref 0–2)
BASOPHILS ABSOLUTE: 0.04 K/UL (ref 0–0.2)
BILIRUB SERPL-MCNC: 0.59 MG/DL (ref 0.3–1.2)
BILIRUBIN DIRECT: 0.18 MG/DL
BILIRUBIN URINE: NEGATIVE
BILIRUBIN, INDIRECT: 0.41 MG/DL (ref 0–1)
BUN BLDV-MCNC: 13 MG/DL (ref 6–20)
BUN/CREAT BLD: ABNORMAL (ref 9–20)
C-REACTIVE PROTEIN: 194.2 MG/L (ref 0–5)
CALCIUM SERPL-MCNC: 9.6 MG/DL (ref 8.6–10.4)
CHLORIDE BLD-SCNC: 94 MMOL/L (ref 98–107)
CO2: 26 MMOL/L (ref 20–31)
COLOR: YELLOW
COMMENT UA: ABNORMAL
CREAT SERPL-MCNC: 1.28 MG/DL (ref 0.7–1.2)
DIFFERENTIAL TYPE: ABNORMAL
EOSINOPHILS RELATIVE PERCENT: 0 % (ref 1–4)
GFR AFRICAN AMERICAN: >60 ML/MIN
GFR NON-AFRICAN AMERICAN: >60 ML/MIN
GFR SERPL CREATININE-BSD FRML MDRD: ABNORMAL ML/MIN/{1.73_M2}
GFR SERPL CREATININE-BSD FRML MDRD: ABNORMAL ML/MIN/{1.73_M2}
GLOBULIN: NORMAL G/DL (ref 1.5–3.8)
GLUCOSE BLD-MCNC: 170 MG/DL (ref 70–99)
GLUCOSE URINE: NEGATIVE
HCT VFR BLD CALC: 38.6 % (ref 40.7–50.3)
HEMOGLOBIN: 12.5 G/DL (ref 13–17)
IMMATURE GRANULOCYTES: 0 %
INR BLD: 1
KETONES, URINE: ABNORMAL
LACTIC ACID, SEPSIS WHOLE BLOOD: 1.1 MMOL/L (ref 0.5–1.9)
LACTIC ACID, SEPSIS WHOLE BLOOD: 1.6 MMOL/L (ref 0.5–1.9)
LACTIC ACID, SEPSIS: NORMAL MMOL/L (ref 0.5–1.9)
LACTIC ACID, SEPSIS: NORMAL MMOL/L (ref 0.5–1.9)
LEUKOCYTE ESTERASE, URINE: NEGATIVE
LIPASE: 16 U/L (ref 13–60)
LYMPHOCYTES # BLD: 5 % (ref 24–43)
MCH RBC QN AUTO: 25.5 PG (ref 25.2–33.5)
MCHC RBC AUTO-ENTMCNC: 32.4 G/DL (ref 28.4–34.8)
MCV RBC AUTO: 78.8 FL (ref 82.6–102.9)
MONOCYTES # BLD: 5 % (ref 3–12)
NITRITE, URINE: NEGATIVE
NRBC AUTOMATED: 0 PER 100 WBC
PARTIAL THROMBOPLASTIN TIME: 31.4 SEC (ref 20.5–30.5)
PDW BLD-RTO: 14.1 % (ref 11.8–14.4)
PH UA: 6.5 (ref 5–8)
PLATELET # BLD: 253 K/UL (ref 138–453)
PLATELET ESTIMATE: ABNORMAL
PMV BLD AUTO: 11.6 FL (ref 8.1–13.5)
POTASSIUM SERPL-SCNC: 4.3 MMOL/L (ref 3.7–5.3)
PROTEIN UA: NEGATIVE
PROTHROMBIN TIME: 11 SEC (ref 9–12)
RBC # BLD: 4.9 M/UL (ref 4.21–5.77)
RBC # BLD: ABNORMAL 10*6/UL
SEDIMENTATION RATE, ERYTHROCYTE: 64 MM (ref 0–15)
SEG NEUTROPHILS: 90 % (ref 36–65)
SEGMENTED NEUTROPHILS ABSOLUTE COUNT: 15.89 K/UL (ref 1.5–8.1)
SODIUM BLD-SCNC: 133 MMOL/L (ref 135–144)
SPECIFIC GRAVITY UA: 1.05 (ref 1–1.03)
TOTAL PROTEIN: 8.1 G/DL (ref 6.4–8.3)
TURBIDITY: CLEAR
URINE HGB: NEGATIVE
UROBILINOGEN, URINE: NORMAL
WBC # BLD: 17.8 K/UL (ref 3.5–11.3)
WBC # BLD: ABNORMAL 10*3/UL

## 2020-09-06 PROCEDURE — G0378 HOSPITAL OBSERVATION PER HR: HCPCS

## 2020-09-06 PROCEDURE — 99284 EMERGENCY DEPT VISIT MOD MDM: CPT

## 2020-09-06 PROCEDURE — 36415 COLL VENOUS BLD VENIPUNCTURE: CPT

## 2020-09-06 PROCEDURE — 6370000000 HC RX 637 (ALT 250 FOR IP): Performed by: EMERGENCY MEDICINE

## 2020-09-06 PROCEDURE — 85610 PROTHROMBIN TIME: CPT

## 2020-09-06 PROCEDURE — 96365 THER/PROPH/DIAG IV INF INIT: CPT

## 2020-09-06 PROCEDURE — 2580000003 HC RX 258: Performed by: HEALTH CARE PROVIDER

## 2020-09-06 PROCEDURE — 70491 CT SOFT TISSUE NECK W/DYE: CPT

## 2020-09-06 PROCEDURE — 96366 THER/PROPH/DIAG IV INF ADDON: CPT

## 2020-09-06 PROCEDURE — 96372 THER/PROPH/DIAG INJ SC/IM: CPT

## 2020-09-06 PROCEDURE — 6360000002 HC RX W HCPCS: Performed by: HEALTH CARE PROVIDER

## 2020-09-06 PROCEDURE — 96376 TX/PRO/DX INJ SAME DRUG ADON: CPT

## 2020-09-06 PROCEDURE — 2580000003 HC RX 258: Performed by: EMERGENCY MEDICINE

## 2020-09-06 PROCEDURE — 83690 ASSAY OF LIPASE: CPT

## 2020-09-06 PROCEDURE — 87086 URINE CULTURE/COLONY COUNT: CPT

## 2020-09-06 PROCEDURE — 80076 HEPATIC FUNCTION PANEL: CPT

## 2020-09-06 PROCEDURE — 85652 RBC SED RATE AUTOMATED: CPT

## 2020-09-06 PROCEDURE — 86140 C-REACTIVE PROTEIN: CPT

## 2020-09-06 PROCEDURE — 6360000004 HC RX CONTRAST MEDICATION: Performed by: EMERGENCY MEDICINE

## 2020-09-06 PROCEDURE — 96367 TX/PROPH/DG ADDL SEQ IV INF: CPT

## 2020-09-06 PROCEDURE — 85025 COMPLETE CBC W/AUTO DIFF WBC: CPT

## 2020-09-06 PROCEDURE — 96375 TX/PRO/DX INJ NEW DRUG ADDON: CPT

## 2020-09-06 PROCEDURE — 81003 URINALYSIS AUTO W/O SCOPE: CPT

## 2020-09-06 PROCEDURE — 80048 BASIC METABOLIC PNL TOTAL CA: CPT

## 2020-09-06 PROCEDURE — 85730 THROMBOPLASTIN TIME PARTIAL: CPT

## 2020-09-06 PROCEDURE — 6360000002 HC RX W HCPCS: Performed by: EMERGENCY MEDICINE

## 2020-09-06 PROCEDURE — 83605 ASSAY OF LACTIC ACID: CPT

## 2020-09-06 PROCEDURE — 87040 BLOOD CULTURE FOR BACTERIA: CPT

## 2020-09-06 RX ORDER — FENTANYL CITRATE 50 UG/ML
25 INJECTION, SOLUTION INTRAMUSCULAR; INTRAVENOUS ONCE
Status: COMPLETED | OUTPATIENT
Start: 2020-09-06 | End: 2020-09-06

## 2020-09-06 RX ORDER — MORPHINE SULFATE 4 MG/ML
4 INJECTION, SOLUTION INTRAMUSCULAR; INTRAVENOUS ONCE
Status: COMPLETED | OUTPATIENT
Start: 2020-09-06 | End: 2020-09-06

## 2020-09-06 RX ORDER — SODIUM CHLORIDE, SODIUM LACTATE, POTASSIUM CHLORIDE, CALCIUM CHLORIDE 600; 310; 30; 20 MG/100ML; MG/100ML; MG/100ML; MG/100ML
1000 INJECTION, SOLUTION INTRAVENOUS ONCE
Status: COMPLETED | OUTPATIENT
Start: 2020-09-06 | End: 2020-09-06

## 2020-09-06 RX ORDER — SODIUM CHLORIDE 0.9 % (FLUSH) 0.9 %
10 SYRINGE (ML) INJECTION PRN
Status: DISCONTINUED | OUTPATIENT
Start: 2020-09-06 | End: 2020-09-08 | Stop reason: HOSPADM

## 2020-09-06 RX ORDER — ONDANSETRON 2 MG/ML
4 INJECTION INTRAMUSCULAR; INTRAVENOUS EVERY 8 HOURS PRN
Status: DISCONTINUED | OUTPATIENT
Start: 2020-09-06 | End: 2020-09-07

## 2020-09-06 RX ORDER — OXYCODONE HYDROCHLORIDE 5 MG/1
5 TABLET ORAL EVERY 4 HOURS PRN
Status: DISCONTINUED | OUTPATIENT
Start: 2020-09-06 | End: 2020-09-08 | Stop reason: HOSPADM

## 2020-09-06 RX ORDER — SODIUM CHLORIDE 0.9 % (FLUSH) 0.9 %
10 SYRINGE (ML) INJECTION EVERY 12 HOURS SCHEDULED
Status: DISCONTINUED | OUTPATIENT
Start: 2020-09-06 | End: 2020-09-08 | Stop reason: HOSPADM

## 2020-09-06 RX ORDER — ONDANSETRON 2 MG/ML
4 INJECTION INTRAMUSCULAR; INTRAVENOUS ONCE
Status: COMPLETED | OUTPATIENT
Start: 2020-09-06 | End: 2020-09-06

## 2020-09-06 RX ORDER — KETOROLAC TROMETHAMINE 30 MG/ML
30 INJECTION, SOLUTION INTRAMUSCULAR; INTRAVENOUS ONCE
Status: COMPLETED | OUTPATIENT
Start: 2020-09-06 | End: 2020-09-06

## 2020-09-06 RX ORDER — MORPHINE SULFATE 4 MG/ML
2 INJECTION, SOLUTION INTRAMUSCULAR; INTRAVENOUS
Status: DISCONTINUED | OUTPATIENT
Start: 2020-09-06 | End: 2020-09-08 | Stop reason: HOSPADM

## 2020-09-06 RX ADMIN — SODIUM CHLORIDE, PRESERVATIVE FREE 10 ML: 5 INJECTION INTRAVENOUS at 22:04

## 2020-09-06 RX ADMIN — AMPICILLIN AND SULBACTAM 1.5 G: 1; .5 INJECTION, POWDER, FOR SOLUTION INTRAVENOUS at 22:29

## 2020-09-06 RX ADMIN — SODIUM CHLORIDE, POTASSIUM CHLORIDE, SODIUM LACTATE AND CALCIUM CHLORIDE 1000 ML: 600; 310; 30; 20 INJECTION, SOLUTION INTRAVENOUS at 11:58

## 2020-09-06 RX ADMIN — PIPERACILLIN AND TAZOBACTAM 4.5 G: 4; .5 INJECTION, POWDER, LYOPHILIZED, FOR SOLUTION INTRAVENOUS; PARENTERAL at 13:23

## 2020-09-06 RX ADMIN — IOHEXOL 75 ML: 350 INJECTION, SOLUTION INTRAVENOUS at 12:05

## 2020-09-06 RX ADMIN — FENTANYL CITRATE 25 MCG: 50 INJECTION, SOLUTION INTRAMUSCULAR; INTRAVENOUS at 11:58

## 2020-09-06 RX ADMIN — MORPHINE SULFATE 2 MG: 4 INJECTION INTRAVENOUS at 17:07

## 2020-09-06 RX ADMIN — ONDANSETRON 4 MG: 2 INJECTION INTRAMUSCULAR; INTRAVENOUS at 13:23

## 2020-09-06 RX ADMIN — OXYCODONE HYDROCHLORIDE 5 MG: 5 TABLET ORAL at 22:33

## 2020-09-06 RX ADMIN — KETOROLAC TROMETHAMINE 30 MG: 30 INJECTION, SOLUTION INTRAMUSCULAR; INTRAVENOUS at 15:04

## 2020-09-06 RX ADMIN — MORPHINE SULFATE 4 MG: 4 INJECTION INTRAVENOUS at 13:37

## 2020-09-06 RX ADMIN — ENOXAPARIN SODIUM 40 MG: 40 INJECTION SUBCUTANEOUS at 17:43

## 2020-09-06 RX ADMIN — VANCOMYCIN HYDROCHLORIDE 2000 MG: 1 INJECTION, POWDER, LYOPHILIZED, FOR SOLUTION INTRAVENOUS at 14:21

## 2020-09-06 ASSESSMENT — ENCOUNTER SYMPTOMS
ABDOMINAL PAIN: 0
CONSTIPATION: 0
VOMITING: 0
SHORTNESS OF BREATH: 1
VOICE CHANGE: 0
TROUBLE SWALLOWING: 0
SORE THROAT: 0
NAUSEA: 0
FACIAL SWELLING: 1
DIARRHEA: 0

## 2020-09-06 ASSESSMENT — PAIN DESCRIPTION - PAIN TYPE
TYPE: ACUTE PAIN
TYPE: ACUTE PAIN

## 2020-09-06 ASSESSMENT — PAIN SCALES - GENERAL
PAINLEVEL_OUTOF10: 7
PAINLEVEL_OUTOF10: 10
PAINLEVEL_OUTOF10: 10
PAINLEVEL_OUTOF10: 7
PAINLEVEL_OUTOF10: 7

## 2020-09-06 ASSESSMENT — PAIN DESCRIPTION - LOCATION
LOCATION: JAW
LOCATION: FACE

## 2020-09-06 ASSESSMENT — PAIN DESCRIPTION - DESCRIPTORS
DESCRIPTORS: ACHING
DESCRIPTORS: CONSTANT;PRESSURE;SORE

## 2020-09-06 ASSESSMENT — PAIN DESCRIPTION - ORIENTATION
ORIENTATION: RIGHT
ORIENTATION: RIGHT

## 2020-09-06 NOTE — ED NOTES
Pt arrives to ED ambulatory alert and oriented x4 with cc of RT side facial swelling and pain, and neck pain. Pt states it started about 3 days ago but it is getting worse, states he feels like sometimes he gets SOB, and is having trouble eating/drinking. Pt states he vomited one time yesterday but is still able to eat drink and swallow. Pt is speaking clearly in complete sentences, no signs of acute distress, resp are even and non-labored, wife at bedside with patient.  Call light in reach, will cont to monitor        Mala De Anda RN  09/06/20 4634

## 2020-09-06 NOTE — H&P
[address] [facility] [address]  CDU / OBSERVATION ENCOUNTER  ATTENDING NOTE     Pt Name: Najma Correia  MRN: [id number]  [place] 1980  Date of evaluation: 9/6/20  Patient's PCP is : No primary care provider on file. CHIEF COMPLAINT       Chief Complaint   Patient presents with    Dental Pain    Facial Swelling         HISTORY OF PRESENT ILLNESS    Najma Correia is a 47 y.o. male who presents with complaints of facial swelling. Patient presented after several days of facial swelling and work-up in the ED included CT scanning. CT scan did show an abscess at the angle of the mandible. Patient was discussed with OMFS who advised the patient should be admitted for IV antibiotics with scheduled follow-up in approximately 36 hours. Patient is to receive IV dosing in the meantime. Patient denied other medical complaint. Patient was with improved pain at the time of my evaluation in the ED. Location/Symptom: Facial swelling and abscess formation  Timing/Onset: Several days  Provocation: Unclear  Quality: Aching pain  Radiation: None  Severity: Moderate to severe  Timing/Duration: Days  Modifying Factors: Uncertain    REVIEW OF SYSTEMS        General ROS - No fevers, No malaise   Ophthalmic ROS - No discharge, No changes in vision  ENT ROS -dental pain dental swelling, cervical adenopathy  Respiratory ROS - no shortness of breath, no cough, no  wheezing  Cardiovascular ROS - No chest pain, no dyspnea on exertion  Gastrointestinal ROS - No abdominal pain, no nausea or vomiting, no change in bowel habits, no black or bloody stools  Genito-Urinary ROS - No dysuria, trouble voiding, or hematuria  Musculoskeletal ROS - No myalgias, No arthalgias  Neurological ROS - No headache, no dizziness/lightheadedness, No focal weakness, no loss of sensation  Dermatological ROS - No lesions, No rash     (PQRS) Advance directives on face sheet per hospital policy.  No change unless specifically mentioned in chart    PAST MEDICAL HISTORY    has no past medical history on file. I have reviewed the past medical history with the patient and it is  pertinent to this complaint. SURGICAL HISTORY      has a past surgical history that includes arm fracture surgery (Right, 5/30/2019). I have reviewed and agree with Surgical History entered and it is  pertinent to this complaint. CURRENT MEDICATIONS     vancomycin (VANCOCIN) 2,000 mg in dextrose 5 % 500 mL IVPB, Once        All medication charted and reviewed. ALLERGIES     has No Known Allergies. FAMILY HISTORY     has no family status information on file. family history is not on file. The patient denies any pertinent family history. I have reviewed and agree with the family history entered. I have reviewed the Family History and it is not significant to the case    SOCIAL HISTORY      reports that he has never smoked. He has never used smokeless tobacco. He reports current alcohol use. He reports current drug use. Drug: Marijuana. I have reviewed and agree with all Social.  There are no  concerns for substance abuse/use. PHYSICAL EXAM     INITIAL VITALS:  weight is 182 lb (82.6 kg). His oral temperature is 98.7 °F (37.1 °C). His blood pressure is 138/90 (abnormal) and his pulse is 103. His respiration is 16 and oxygen saturation is 98%. CONSTITUTIONAL: AOx4, no apparent distress, appears stated age     HEAD: normocephalic, atraumatic   EYES: PERRLA, EOMI    ENT:  Right submandibular abscess with dental infection. Patient with cervical adenopathy on same side. Patient with fluctuance but no problems with phonation, hoarseness, airway compromise.    NECK: supple, symmetric   BACK: symmetric   LUNGS: clear to auscultation bilaterally   CARDIOVASCULAR: regular rate and rhythm, no murmurs, rubs or gallops   ABDOMEN: soft, non-tender, non-distended with normal active bowel sounds   NEUROLOGIC:  MAEx4, no focal sensory or motor deficits   MUSCULOSKELETAL: no clubbing, cyanosis or edema   SKIN: no rash or wounds       DIFFERENTIAL DIAGNOSIS/MDM:     Patient was discussed between the ER and the OMFS attending. Arrangements were made for patient to be taken to the OR on Tuesday. Patient to be discharged from observation unit to [facility] after several doses of IV antibiotics. DIAGNOSTIC RESULTS            RADIOLOGY:   I directly visualized the following  images and reviewed the radiologist interpretations:    Ct Soft Tissue Neck W Contrast    Result Date: 9/6/2020  EXAMINATION: CT OF THE NECK SOFT TISSUE WITH CONTRAST  9/6/2020 TECHNIQUE: CT of the neck was performed with the administration of intravenous contrast. Multiplanar reformatted images are provided for review. Dose modulation, iterative reconstruction, and/or weight based adjustment of the mA/kV was utilized to reduce the radiation dose to as low as reasonably achievable. COMPARISON: 05/13/2019 HISTORY: ORDERING SYSTEM PROVIDED HISTORY: R facial swelling and neck pain with difficulty moving neck, opening jaw TECHNOLOGIST PROVIDED HISTORY: R facial swelling and neck pain with difficulty moving neck, opening jaw Reason for Exam: R facial swelling and neck pain with difficulty moving neck, opening jaw FINDINGS: PHARYNX/LARYNX:  There is induration in the right parapharyngeal fat space. There are calcifications in the palatine tonsils, compatible with sequela of remote infection. There is hypertrophy of the lymphoid tissue in Waldeyer's ring, likely reactive. The vallecula and epiglottis are unremarkable. The aryepiglottic folds are normal in appearance. The true vocal cords are unremarkable. SALIVARY GLANDS/THYROID:  The thyroid gland is normal in appearance. Inflammatory changes are noted around the right submandibular gland where there appears to be an obstructing stone in the right submandibular duct measuring 5 mm that is likely chronic as this was seen on the previous CT from 05/13/2019.   The left

## 2020-09-06 NOTE — ED PROVIDER NOTES
Transportation needs     Medical: Not on file     Non-medical: Not on file   Tobacco Use    Smoking status: Never Smoker    Smokeless tobacco: Never Used   Substance and Sexual Activity    Alcohol use: Yes     Comment: socially    Drug use: Yes     Types: Marijuana     Comment: pt denies marijuana in preop    Sexual activity: Yes   Lifestyle    Physical activity     Days per week: Not on file     Minutes per session: Not on file    Stress: Not on file   Relationships    Social connections     Talks on phone: Not on file     Gets together: Not on file     Attends Yazidism service: Not on file     Active member of club or organization: Not on file     Attends meetings of clubs or organizations: Not on file     Relationship status: Not on file    Intimate partner violence     Fear of current or ex partner: Not on file     Emotionally abused: Not on file     Physically abused: Not on file     Forced sexual activity: Not on file   Other Topics Concern    Not on file   Social History Narrative    Not on file       No family history on file. Allergies:  Patient has no known allergies. Home Medications:  Prior to Admission medications    Medication Sig Start Date End Date Taking? Authorizing Provider   naproxen (NAPROSYN) 500 MG tablet Take 1 tablet by mouth 2 times daily (with meals) for 30 doses 5/13/19 5/28/19  Indiana Gray MD       REVIEW OF SYSTEMS    (2-9 systems for level 4, 10 or more for level 5)      Review of Systems   Constitutional: Positive for chills. Negative for fever. HENT: Positive for facial swelling. Negative for dental problem (In fact pain has not worsened with chewing), ear pain, hearing loss, sore throat, trouble swallowing and voice change (No pain with voice). Eyes: Negative for visual disturbance. Respiratory: Positive for shortness of breath. Cardiovascular: Negative for chest pain.    Gastrointestinal: Negative for abdominal pain, constipation, diarrhea, nausea and RBC 4.90 4.21 - 5.77 m/uL    Hemoglobin 12.5 (L) 13.0 - 17.0 g/dL    Hematocrit 38.6 (L) 40.7 - 50.3 %    MCV 78.8 (L) 82.6 - 102.9 fL    MCH 25.5 25.2 - 33.5 pg    MCHC 32.4 28.4 - 34.8 g/dL    RDW 14.1 11.8 - 14.4 %    Platelets 448 429 - 939 k/uL    MPV 11.6 8.1 - 13.5 fL    NRBC Automated 0.0 0.0 per 100 WBC    Differential Type NOT REPORTED     Seg Neutrophils 90 (H) 36 - 65 %    Lymphocytes 5 (L) 24 - 43 %    Monocytes 5 3 - 12 %    Eosinophils % 0 (L) 1 - 4 %    Basophils 0 0 - 2 %    Immature Granulocytes 0 0 %    Segs Absolute 15.89 (H) 1.50 - 8.10 k/uL    Absolute Lymph # 0.97 (L) 1.10 - 3.70 k/uL    Absolute Mono # 0.84 0.10 - 1.20 k/uL    Absolute Eos # <0.03 0.00 - 0.44 k/uL    Basophils Absolute 0.04 0.00 - 0.20 k/uL    Absolute Immature Granulocyte 0.08 0.00 - 0.30 k/uL    WBC Morphology NOT REPORTED     RBC Morphology MICROCYTOSIS PRESENT     Platelet Estimate NOT REPORTED    Basic Metabolic Panel w/ Reflex to MG   Result Value Ref Range    Glucose 170 (H) 70 - 99 mg/dL    BUN 13 6 - 20 mg/dL    CREATININE 1.28 (H) 0.70 - 1.20 mg/dL    Bun/Cre Ratio NOT REPORTED 9 - 20    Calcium 9.6 8.6 - 10.4 mg/dL    Sodium 133 (L) 135 - 144 mmol/L    Potassium 4.3 3.7 - 5.3 mmol/L    Chloride 94 (L) 98 - 107 mmol/L    CO2 26 20 - 31 mmol/L    Anion Gap 13 9 - 17 mmol/L    GFR Non-African American >60 >60 mL/min    GFR African American >60 >60 mL/min    GFR Comment          GFR Staging NOT REPORTED    Sedimentation Rate   Result Value Ref Range    Sed Rate 64 (H) 0 - 15 mm   C-Reactive Protein   Result Value Ref Range    .2 (H) 0.0 - 5.0 mg/L   Urinalysis   Result Value Ref Range    Color, UA YELLOW YELLOW    Turbidity UA CLEAR CLEAR    Glucose, Ur NEGATIVE NEGATIVE    Bilirubin Urine NEGATIVE NEGATIVE    Ketones, Urine SMALL (A) NEGATIVE    Specific Gravity, UA 1.049 (H) 1.005 - 1.030    Urine Hgb NEGATIVE NEGATIVE    pH, UA 6.5 5.0 - 8.0    Protein, UA NEGATIVE NEGATIVE    Urobilinogen, Urine Normal

## 2020-09-06 NOTE — ED PROVIDER NOTES
[facility] ED  eMERGENCY dEPARTMENT eNCOUnter   Attending Attestation     Pt Name: Miko Dixon  MRN: [id number]  [place] 1980  Date of evaluation: 9/6/20       Miko Dixon is a 47 y.o. male who presents with Dental Pain and Facial Swelling      History: Patient with right-sided facial swelling. Patient says he is been having some dental issues. Patient says he been trying to take care of them at home but has been unable to. Patient said he woke up this morning with significant pain and swelling over the right face. Exam: Heart rate and rhythm are regular. Lungs are clear to auscultation bilaterally. Abdomen is soft, nontender. Patient has significant right-sided lower facial swelling and tenderness. Patient has some trismus. Patient has no submandibular swelling. Patient has no pain over the submandibular area. Plan for CT of the face and neck and blood work. Will start antibiotics and plan for admission. I performed a history and physical examination of the patient and discussed management with the resident. I reviewed the residents note and agree with the documented findings and plan of care. Any areas of disagreement are noted on the chart. I was personally present for the key portions of any procedures. I have documented in the chart those procedures where I was not present during the key portions. I have personally reviewed all images and agree with the resident's interpretation. I have reviewed the emergency nurses triage note. I agree with the chief complaint, past medical history, past surgical history, allergies, medications, social and family history as documented unless otherwise noted below. Documentation of the HPI, Physical Exam and Medical Decision Making performed by medical students or scribes is based on my personal performance of the HPI, PE and MDM.  For Phys Assistant/ Nurse Practitioner cases/documentation I have had a face to face evaluation of this

## 2020-09-06 NOTE — ED PROVIDER NOTES
components:    PTT 31.4 (*)     All other components within normal limits   CULTURE, BLOOD 1   CULTURE, URINE   CULTURE, BLOOD 2   LACTATE, SEPSIS   LACTATE, SEPSIS   LIPASE   HEPATIC FUNCTION PANEL   PROTIME-INR       Ct Soft Tissue Neck W Contrast    Result Date: 9/6/2020  EXAMINATION: CT OF THE NECK SOFT TISSUE WITH CONTRAST  9/6/2020 TECHNIQUE: CT of the neck was performed with the administration of intravenous contrast. Multiplanar reformatted images are provided for review. Dose modulation, iterative reconstruction, and/or weight based adjustment of the mA/kV was utilized to reduce the radiation dose to as low as reasonably achievable. COMPARISON: 05/13/2019 HISTORY: ORDERING SYSTEM PROVIDED HISTORY: R facial swelling and neck pain with difficulty moving neck, opening jaw TECHNOLOGIST PROVIDED HISTORY: R facial swelling and neck pain with difficulty moving neck, opening jaw Reason for Exam: R facial swelling and neck pain with difficulty moving neck, opening jaw FINDINGS: PHARYNX/LARYNX:  There is induration in the right parapharyngeal fat space. There are calcifications in the palatine tonsils, compatible with sequela of remote infection. There is hypertrophy of the lymphoid tissue in Waldeyer's ring, likely reactive. The vallecula and epiglottis are unremarkable. The aryepiglottic folds are normal in appearance. The true vocal cords are unremarkable. SALIVARY GLANDS/THYROID:  The thyroid gland is normal in appearance. Inflammatory changes are noted around the right submandibular gland where there appears to be an obstructing stone in the right submandibular duct measuring 5 mm that is likely chronic as this was seen on the previous CT from 05/13/2019. The left submandibular gland is unremarkable. The parotid glands are normal in appearance. LYMPH NODES:  There are numerous enlarged right submandibular and cervical lymph nodes which are presumably reactive.  SOFT TISSUES: There is an abscess along the right mandibular angle, extending into the right masseter muscle measuring approximately 2.8 x 3.4 x 3.2 cm. There is an associated myositis and cellulitis along the right side of the face. BRAIN/ORBITS/SINUSES: Limited images through the cerebral and cerebellar parenchyma are unremarkable. The orbits are normal in appearance. The paranasal sinuses and mastoid air cells are clear. LUNG APICES/SUPERIOR MEDIASTINUM:  Limited images through the lung apices are unremarkable. There is residual thymic tissue. No superior mediastinal adenopathy. BONES:  No fracture or osseous destructive lesion. *There is an abscess noted along the posterior and inferior aspect of the right mandibular angle, extending into the right masseter muscle. There is an associated myositis in the right masseter muscle and a right facial cellulitis. Reactive cervical lymphadenopathy. *Chronic obstructing stone in the right submandibular duct proximally measuring 5 mm, unchanged. RECENT VITALS:     Temp: 98.7 °F (37.1 °C),  Pulse: 103, Resp: 16, BP: (!) 138/90, SpO2: 98 %    ED Course as of Sep 06 1610   Sun Sep 06, 2020   1347 9AM Tuesday morning appt with Dr. Octavia Gallardo. He is at [address] [place] [address].    [TS]   [phone] with OMFS who advised patient will require outpatient appointment in 2 days. [TS]      ED Course User Index  [TS] Gracia Russell MD       This patient is a 47 y.o. Male with some to be a large abscess right mandibular angle to right masseter. Myositis and right facial cellulitis. Received Vanco and Zosyn. Pain control with IV medication. Case discussed with OMFS. Will follow outpatient. Admitted to ETU. OUTSTANDING TASKS / RECOMMENDATIONS:    1. Admitted ETU, awaiting bed     FINAL IMPRESSION:     1. Oral abscess        DISPOSITION:         DISPOSITION:  []  Discharge   []  Transfer -    [x]  Admission -     []  Against Medical Advice   []  Eloped   FOLLOW-UP: No follow-up provider specified.

## 2020-09-06 NOTE — CARE COORDINATION
Case Management Initial Discharge Plan  Talia Meier,             Met with:patient to discuss discharge plans. Information verified: address, contacts, phone number, , insurance Yes    Emergency Contact/Next of Kin name & numberJohn Tarango [phone]    PCP: No primary care provider on file. Date of last visit: PT states that he has a PCP and was last seen in May     Insurance Provider: [employer]     Discharge Planning    Living Arrangements:  Family Members   Support Systems:  Family Members    Home has 1 stories  3 flights of stairs to climb to get into front door    Patient able to perform ADL's:Independent    Current Services (outpatient & in home) none   DME equipment: na  DME provider: na    Receiving oral anticoagulation therapy? No    If indicated:   Physician managing anticoagulation treatment: na  Where does patient obtain lab work for ATC treatment? na      Potential Assistance Needed:  N/A    Patient agreeable to home care: No  [place] of choice provided:  n/a    Prior SNF/Rehab Placement and Facility: None   Agreeable to SNF/Rehab: No  [place] of choice provided: n/a     Evaluation: n/a    Expected Discharge date:  20    Patient expects to be discharged to:  home  Follow Up Appointment: Best Day/ Time: Monday PM    Transportation provider: Brother   Transportation arrangements needed for discharge: No    Readmission Risk              Risk of Unplanned Readmission:        0             Does patient have a readmission risk score greater than 14?: CARRIE   If yes, follow-up appointment must be made within 7 days of discharge.      Goals of Care: Get treatment       Discharge Plan: Home Independently           Electronically signed by Nick Meyers RN on 20 at 7:15 PM EDT

## 2020-09-07 LAB
CULTURE: NO GROWTH
Lab: NORMAL
SPECIMEN DESCRIPTION: NORMAL

## 2020-09-07 PROCEDURE — 6370000000 HC RX 637 (ALT 250 FOR IP): Performed by: EMERGENCY MEDICINE

## 2020-09-07 PROCEDURE — 96376 TX/PRO/DX INJ SAME DRUG ADON: CPT

## 2020-09-07 PROCEDURE — 6360000002 HC RX W HCPCS: Performed by: EMERGENCY MEDICINE

## 2020-09-07 PROCEDURE — 96366 THER/PROPH/DIAG IV INF ADDON: CPT

## 2020-09-07 PROCEDURE — 6360000002 HC RX W HCPCS: Performed by: STUDENT IN AN ORGANIZED HEALTH CARE EDUCATION/TRAINING PROGRAM

## 2020-09-07 PROCEDURE — G0378 HOSPITAL OBSERVATION PER HR: HCPCS

## 2020-09-07 PROCEDURE — 6360000002 HC RX W HCPCS: Performed by: HEALTH CARE PROVIDER

## 2020-09-07 PROCEDURE — 2580000003 HC RX 258: Performed by: EMERGENCY MEDICINE

## 2020-09-07 PROCEDURE — 2580000003 HC RX 258: Performed by: HEALTH CARE PROVIDER

## 2020-09-07 RX ORDER — ONDANSETRON 2 MG/ML
4 INJECTION INTRAMUSCULAR; INTRAVENOUS EVERY 6 HOURS PRN
Status: DISCONTINUED | OUTPATIENT
Start: 2020-09-07 | End: 2020-09-08 | Stop reason: HOSPADM

## 2020-09-07 RX ADMIN — AMPICILLIN AND SULBACTAM 1.5 G: 1; .5 INJECTION, POWDER, FOR SOLUTION INTRAVENOUS at 20:59

## 2020-09-07 RX ADMIN — MORPHINE SULFATE 2 MG: 4 INJECTION INTRAVENOUS at 00:21

## 2020-09-07 RX ADMIN — SODIUM CHLORIDE, PRESERVATIVE FREE 10 ML: 5 INJECTION INTRAVENOUS at 21:00

## 2020-09-07 RX ADMIN — AMPICILLIN AND SULBACTAM 1.5 G: 1; .5 INJECTION, POWDER, FOR SOLUTION INTRAVENOUS at 06:18

## 2020-09-07 RX ADMIN — AMPICILLIN AND SULBACTAM 1.5 G: 1; .5 INJECTION, POWDER, FOR SOLUTION INTRAVENOUS at 15:04

## 2020-09-07 RX ADMIN — ONDANSETRON 4 MG: 2 INJECTION INTRAMUSCULAR; INTRAVENOUS at 15:04

## 2020-09-07 RX ADMIN — OXYCODONE HYDROCHLORIDE 5 MG: 5 TABLET ORAL at 20:59

## 2020-09-07 ASSESSMENT — PAIN SCALES - GENERAL
PAINLEVEL_OUTOF10: 10
PAINLEVEL_OUTOF10: 8
PAINLEVEL_OUTOF10: 10

## 2020-09-07 ASSESSMENT — PAIN DESCRIPTION - ONSET: ONSET: ON-GOING

## 2020-09-07 ASSESSMENT — PAIN DESCRIPTION - ORIENTATION: ORIENTATION: RIGHT

## 2020-09-07 ASSESSMENT — PAIN DESCRIPTION - PROGRESSION: CLINICAL_PROGRESSION: NOT CHANGED

## 2020-09-07 ASSESSMENT — PAIN DESCRIPTION - PAIN TYPE: TYPE: ACUTE PAIN

## 2020-09-07 ASSESSMENT — PAIN DESCRIPTION - FREQUENCY: FREQUENCY: CONTINUOUS

## 2020-09-07 ASSESSMENT — PAIN - FUNCTIONAL ASSESSMENT: PAIN_FUNCTIONAL_ASSESSMENT: PREVENTS OR INTERFERES SOME ACTIVE ACTIVITIES AND ADLS

## 2020-09-07 ASSESSMENT — PAIN DESCRIPTION - LOCATION: LOCATION: JAW

## 2020-09-07 ASSESSMENT — PAIN DESCRIPTION - DESCRIPTORS: DESCRIPTORS: ACHING;PRESSURE

## 2020-09-07 NOTE — PROGRESS NOTES
OBS/CDU   RESIDENT NOTE      Patients PCP is: No primary care provider on file. SUBJECTIVE      Patient reports nausea overnight, but no vomiting. He states he tried to hold vomitus down as he cannot open his mouth enough to vomit. He is tolerating clear liquids. Patient requested going outside to smoke. I counseled patient this would not assist in the healing of his dental abscess and instead offered nicotine patch. Has been able to tolerate a full diet without nausea or vomiting. The patient is urinating on his own and is passing flatus. Denies fever, chills, vomiting, chest pain, shortness of breath, abdominal pain, focal weakness, numbness, tingling, urinary/bowel symptoms, vision changes, visual hallucinations, or headache. PHYSICAL EXAM      General: NAD, AO X 3  Heent: EMOI, PERRL, patient swelling present right lower jaw. Neck: SUPPLE, NO JVD  Cardiovascular: RRR, S1S2  Pulmonary: CTAB, NO SOB  Abdomen: SOFT, NTTP, ND, +BS  Extremities: +2/4 PULSES DISTAL, NO SWELLING  Neuro / Psych: NO NUMBNESS OR TINGLING, MENTATION AT BASELINE    PERTINENT TEST /EXAMS      I have reviewed all available laboratory results. MEDICATIONS CURRENT   ondansetron (ZOFRAN) injection 4 mg, Q6H PRN  nicotine (NICODERM CQ) 7 MG/24HR 1 patch, Daily  sodium chloride flush 0.9 % injection 10 mL, 2 times per day  sodium chloride flush 0.9 % injection 10 mL, PRN  enoxaparin (LOVENOX) injection 40 mg, Daily  oxyCODONE (ROXICODONE) immediate release tablet 5 mg, Q4H PRN  morphine injection 2 mg, Q2H PRN  ampicillin-sulbactam (UNASYN) 1.5 g IVPB minibag, Q6H        All medication charted and reviewed. CONSULTS      IP CONSULT TO ORAL SURGERY    ASSESSMENT/PLAN       Gabriele Samuel is a 47 y.o. male who presents with right-sided dental abscess. Patient is currently receiving IV Unasyn.     · Reevaluation with OMFS  · Continue IV Unasyn  · Nicotine patch given  · Continue home medications and pain control  · Monitor vitals, labs, and imaging  · DISPO: pending consults and clinical improvement    --  [facility]  Emergency Medicine Resident Physician     This dictation was generated by voice recognition computer software. Although all attempts are made to edit the dictation for accuracy, there may be errors in the transcription that are not intended.

## 2020-09-07 NOTE — PROGRESS NOTES
CDU Daily Progress Note  Attending Physician       Pt Name: Bonifacio Atkinson  MRN: [id number]  [place] 1980  Date of evaluation: 9/7/20    I performed a history and physical examination of the patient and discussed management with the resident. I reviewed the residents note and agree with the documented findings and plan of care. Any areas of disagreement are noted on the chart. I was personally present for the key portions of any procedures. I have documented in the chart those procedures where I was not present during the key portions. I have reviewed the emergency nurses triage note. I agree with the chief complaint, past medical history, past surgical history, allergies, medications, social and family history as documented unless otherwise noted below. Documentation of the HPI, Physical Exam and Medical Decision Making performed by medical students or scribes is based on my personal performance of the HPI, PE and MDM. For Physician Assistant/ Nurse Practitioner cases/documentation I have personally evaluated this patient and have completed at least one if not all key elements of the E/M (history, physical exam, and MDM). Additional findings are as noted. The Family History, Social History and Review of Systems are unchanged from the previous day. No significant events overnight. Not diabetic. Nonsmoker. Still swollen and with discomfort. Discussed the importance of antibiotics before drainage. OMF to see tomorrow.       Candie Driscoll MD  Attending Physician  Critical Decision Unit

## 2020-09-08 VITALS
DIASTOLIC BLOOD PRESSURE: 85 MMHG | HEART RATE: 94 BPM | OXYGEN SATURATION: 97 % | SYSTOLIC BLOOD PRESSURE: 138 MMHG | WEIGHT: 182 LBS | RESPIRATION RATE: 18 BRPM | BODY MASS INDEX: 24.68 KG/M2 | TEMPERATURE: 99.5 F

## 2020-09-08 PROCEDURE — 2580000003 HC RX 258: Performed by: HEALTH CARE PROVIDER

## 2020-09-08 PROCEDURE — 96376 TX/PRO/DX INJ SAME DRUG ADON: CPT

## 2020-09-08 PROCEDURE — 6370000000 HC RX 637 (ALT 250 FOR IP): Performed by: EMERGENCY MEDICINE

## 2020-09-08 PROCEDURE — 6360000002 HC RX W HCPCS: Performed by: EMERGENCY MEDICINE

## 2020-09-08 PROCEDURE — 6360000002 HC RX W HCPCS: Performed by: HEALTH CARE PROVIDER

## 2020-09-08 PROCEDURE — G0378 HOSPITAL OBSERVATION PER HR: HCPCS

## 2020-09-08 PROCEDURE — 96366 THER/PROPH/DIAG IV INF ADDON: CPT

## 2020-09-08 RX ORDER — OXYCODONE HYDROCHLORIDE 5 MG/1
5 TABLET ORAL EVERY 4 HOURS PRN
Qty: 18 TABLET | Refills: 0 | Status: SHIPPED | OUTPATIENT
Start: 2020-09-08 | End: 2020-09-08 | Stop reason: HOSPADM

## 2020-09-08 RX ORDER — AMOXICILLIN AND CLAVULANATE POTASSIUM 875; 125 MG/1; MG/1
1 TABLET, FILM COATED ORAL 2 TIMES DAILY
Qty: 14 TABLET | Refills: 0 | Status: SHIPPED | OUTPATIENT
Start: 2020-09-08 | End: 2020-09-15

## 2020-09-08 RX ORDER — OXYCODONE HYDROCHLORIDE 5 MG/1
5 TABLET ORAL EVERY 6 HOURS PRN
Qty: 10 TABLET | Refills: 0 | Status: SHIPPED | OUTPATIENT
Start: 2020-09-08 | End: 2020-09-11

## 2020-09-08 RX ORDER — AMOXICILLIN AND CLAVULANATE POTASSIUM 875; 125 MG/1; MG/1
1 TABLET, FILM COATED ORAL 2 TIMES DAILY
Qty: 14 TABLET | Refills: 0 | Status: SHIPPED | OUTPATIENT
Start: 2020-09-08 | End: 2020-09-08 | Stop reason: HOSPADM

## 2020-09-08 RX ADMIN — OXYCODONE HYDROCHLORIDE 5 MG: 5 TABLET ORAL at 04:41

## 2020-09-08 RX ADMIN — MORPHINE SULFATE 2 MG: 4 INJECTION INTRAVENOUS at 00:38

## 2020-09-08 RX ADMIN — OXYCODONE HYDROCHLORIDE 5 MG: 5 TABLET ORAL at 08:18

## 2020-09-08 RX ADMIN — AMPICILLIN AND SULBACTAM 1.5 G: 1; .5 INJECTION, POWDER, FOR SOLUTION INTRAVENOUS at 04:44

## 2020-09-08 ASSESSMENT — PAIN SCALES - GENERAL
PAINLEVEL_OUTOF10: 9
PAINLEVEL_OUTOF10: 8
PAINLEVEL_OUTOF10: 10

## 2020-09-08 NOTE — PROGRESS NOTES
OBS/CDU   RESIDENT NOTE      Patients PCP is: No primary care provider on file. SUBJECTIVE      No acute events overnight. He reports continued pain in his right jaw, inability to open mouth completely, however he says that his symptoms have improved since being here. The patient is urinating on his own and is passing flatus. Denies fever, chills, nausea, vomiting, chest pain, shortness of breath, abdominal pain, focal weakness, numbness, tingling, urinary/bowel symptoms, vision changes, visual hallucinations, or headache. PHYSICAL EXAM      General: NAD, AO X 3  Heent: EMOI, PERRL; significant swelling to the right buccal space and lower mandible. This area of swelling is tender to palpation and firm, but not extraordinarily warm compared to the rest of his body  Neck: SUPPLE, NO JVD  Cardiovascular: RRR, S1S2  Pulmonary: CTAB, NO SOB  Abdomen: SOFT, NTTP, ND, +BS  Extremities: +2/4 PULSES DISTAL, NO SWELLING  Neuro / Psych: NO NUMBNESS OR TINGLING, MENTATION AT BASELINE    PERTINENT TEST /EXAMS      I have reviewed all available laboratory results. MEDICATIONS CURRENT   ondansetron (ZOFRAN) injection 4 mg, Q6H PRN  nicotine (NICODERM CQ) 7 MG/24HR 1 patch, Daily  sodium chloride flush 0.9 % injection 10 mL, 2 times per day  sodium chloride flush 0.9 % injection 10 mL, PRN  enoxaparin (LOVENOX) injection 40 mg, Daily  oxyCODONE (ROXICODONE) immediate release tablet 5 mg, Q4H PRN  morphine injection 2 mg, Q2H PRN  ampicillin-sulbactam (UNASYN) 1.5 g IVPB minibag, Q6H        All medication charted and reviewed. CONSULTS      IP CONSULT TO ORAL SURGERY    ASSESSMENT/PLAN       Jt Hanson is a 47 y.o. male who presents with 3 days of right-sided oral abscess. He received 36 hours of IV Unasyn and reports that the swelling has improved. Patient does have an outpatient appointment with [facility] Oral Surgery clinic at 9 AM this morning.     · Discharge today  · Will write prescription

## 2020-09-08 NOTE — DISCHARGE SUMMARY
CDU Discharge Summary        Patient:  Gabriele Samuel  YOB: 1980    MRN: [id number]   Acct: [de-identified]    Primary Care Physician: No primary care provider on file. Admit date:  9/6/2020 11:28 AM  Discharge date: 9/8/2020  8:40 AM     Discharge Diagnoses:     Acute oral abscess due to unknown etiology  Improved with 36 hours of Unasyn, pain controlled using Naprosyn and Roxicodone    Follow-up:  Call today/tomorrow for a follow up appointment with No primary care provider on file. , or return to the Emergency Room with worsening symptoms    Stressed to patient the importance of following up with primary care doctor for further workup/management of symptoms. Pt verbalizes understanding and agrees with plan. Discharge Medications:  Changes to medications          Dina Recinos   Happy Medication Instructions EX:[id number]    Printed on:09/08/20 6906   Medication Information                      amoxicillin-clavulanate (AUGMENTIN) 875-125 MG per tablet  Take 1 tablet by mouth 2 times daily for 7 days             naproxen (NAPROSYN) 500 MG tablet  Take 1 tablet by mouth 2 times daily (with meals) for 30 doses             oxyCODONE (ROXICODONE) 5 MG immediate release tablet  Take 1 tablet by mouth every 6 hours as needed for Pain for up to 3 days. Diet:  Diet NPO, After Midnight , Advance as tolerated     Activity:  As tolerated    Consultants: IP CONSULT TO ORAL SURGERY    Procedures:  Not indicated     Diagnostic Test:   Results for orders placed or performed during the hospital encounter of 09/06/20   Culture, Urine    Specimen: Urine, clean catch   Result Value Ref Range    Specimen Description . CLEAN CATCH URINE     Special Requests NOT REPORTED     Culture NO GROWTH    Culture, Blood 1    Specimen: Blood   Result Value Ref Range    Specimen Description . BLOOD     Special Requests LAC 5MLS     Culture NO GROWTH 1 DAY    Culture, Blood 2    Specimen: Blood   Result Value Ref Range    Specimen Description . BLOOD     Special Requests R. HAND 12ML     Culture NO GROWTH 1 DAY    CBC Auto Differential   Result Value Ref Range    WBC 17.8 (H) 3.5 - 11.3 k/uL    RBC 4.90 4.21 - 5.77 m/uL    Hemoglobin 12.5 (L) 13.0 - 17.0 g/dL    Hematocrit 38.6 (L) 40.7 - 50.3 %    MCV 78.8 (L) 82.6 - 102.9 fL    MCH 25.5 25.2 - 33.5 pg    MCHC 32.4 28.4 - 34.8 g/dL    RDW 14.1 11.8 - 14.4 %    Platelets 348 823 - 989 k/uL    MPV 11.6 8.1 - 13.5 fL    NRBC Automated 0.0 0.0 per 100 WBC    Differential Type NOT REPORTED     Seg Neutrophils 90 (H) 36 - 65 %    Lymphocytes 5 (L) 24 - 43 %    Monocytes 5 3 - 12 %    Eosinophils % 0 (L) 1 - 4 %    Basophils 0 0 - 2 %    Immature Granulocytes 0 0 %    Segs Absolute 15.89 (H) 1.50 - 8.10 k/uL    Absolute Lymph # 0.97 (L) 1.10 - 3.70 k/uL    Absolute Mono # 0.84 0.10 - 1.20 k/uL    Absolute Eos # <0.03 0.00 - 0.44 k/uL    Basophils Absolute 0.04 0.00 - 0.20 k/uL    Absolute Immature Granulocyte 0.08 0.00 - 0.30 k/uL    WBC Morphology NOT REPORTED     RBC Morphology MICROCYTOSIS PRESENT     Platelet Estimate NOT REPORTED    Basic Metabolic Panel w/ Reflex to MG   Result Value Ref Range    Glucose 170 (H) 70 - 99 mg/dL    BUN 13 6 - 20 mg/dL    CREATININE 1.28 (H) 0.70 - 1.20 mg/dL    Bun/Cre Ratio NOT REPORTED 9 - 20    Calcium 9.6 8.6 - 10.4 mg/dL    Sodium 133 (L) 135 - 144 mmol/L    Potassium 4.3 3.7 - 5.3 mmol/L    Chloride 94 (L) 98 - 107 mmol/L    CO2 26 20 - 31 mmol/L    Anion Gap 13 9 - 17 mmol/L    GFR Non-African American >60 >60 mL/min    GFR African American >60 >60 mL/min    GFR Comment          GFR Staging NOT REPORTED    Sedimentation Rate   Result Value Ref Range    Sed Rate 64 (H) 0 - 15 mm   C-Reactive Protein   Result Value Ref Range    .2 (H) 0.0 - 5.0 mg/L   Urinalysis   Result Value Ref Range    Color, UA YELLOW YELLOW    Turbidity UA CLEAR CLEAR    Glucose, Ur NEGATIVE NEGATIVE    Bilirubin Urine NEGATIVE NEGATIVE    Ketones, Urine SMALL (A) NEGATIVE    Specific Gravity, UA 1.049 (H) 1.005 - 1.030    Urine Hgb NEGATIVE NEGATIVE    pH, UA 6.5 5.0 - 8.0    Protein, UA NEGATIVE NEGATIVE    Urobilinogen, Urine Normal Normal    Nitrite, Urine NEGATIVE NEGATIVE    Leukocyte Esterase, Urine NEGATIVE NEGATIVE    Urinalysis Comments       Microscopic exam not performed based on chemical results unless requested in original order. Lactate, Sepsis   Result Value Ref Range    Lactic Acid, Sepsis NOT REPORTED 0.5 - 1.9 mmol/L    Lactic Acid, Sepsis, Whole Blood 1.6 0.5 - 1.9 mmol/L   Lactate, Sepsis   Result Value Ref Range    Lactic Acid, Sepsis NOT REPORTED 0.5 - 1.9 mmol/L    Lactic Acid, Sepsis, Whole Blood 1.1 0.5 - 1.9 mmol/L   Lipase   Result Value Ref Range    Lipase 16 13 - 60 U/L   Hepatic Function Panel   Result Value Ref Range    Alb 4.2 3.5 - 5.2 g/dL    Alkaline Phosphatase 86 40 - 129 U/L    ALT 22 5 - 41 U/L    AST 21 <40 U/L    Total Bilirubin 0.59 0.3 - 1.2 mg/dL    Bilirubin, Direct 0.18 <0.31 mg/dL    Bilirubin, Indirect 0.41 0.00 - 1.00 mg/dL    Total Protein 8.1 6.4 - 8.3 g/dL    Globulin NOT REPORTED 1.5 - 3.8 g/dL    Albumin/Globulin Ratio 1.1 1.0 - 2.5   Protime-INR   Result Value Ref Range    Protime 11.0 9.0 - 12.0 sec    INR 1.0    APTT   Result Value Ref Range    PTT 31.4 (H) 20.5 - 30.5 sec     Ct Soft Tissue Neck W Contrast    Result Date: 9/6/2020  EXAMINATION: CT OF THE NECK SOFT TISSUE WITH CONTRAST  9/6/2020 TECHNIQUE: CT of the neck was performed with the administration of intravenous contrast. Multiplanar reformatted images are provided for review. Dose modulation, iterative reconstruction, and/or weight based adjustment of the mA/kV was utilized to reduce the radiation dose to as low as reasonably achievable.  COMPARISON: 05/13/2019 HISTORY: ORDERING SYSTEM PROVIDED HISTORY: R facial swelling and neck pain with difficulty moving neck, opening jaw TECHNOLOGIST PROVIDED HISTORY: R facial swelling and neck pain with difficulty *Chronic obstructing stone in the right submandibular duct proximally measuring 5 mm, unchanged. Physical Exam:    General appearance - NAD, AOx 3; significant swelling of the right buccal space, mandible. Tender to palpation  Lungs -CTAB, no R/R/R. No evidence of respiratory distress  Heart - RRR, no M/R/G  Abdomen - Soft, NT/ND  Neurological:  MAEx4, No focal motor deficit, sensory loss  Extremities - Cap refil <2 sec in all ext., no edema  Skin -warm, dry      Hospital Course:  Clinical course has improved, labs and imaging reviewed. Deirdre Burns originally presented to the hospital on 9/6/2020 11:28 AM. with right-sided facial swelling and pain, inability to open his mouth. At that time it was determined that He required further observation and IV antibiotics for treatment of oral abscess. He was admitted and labs and imaging were followed daily. Imaging results as above. He is medically stable to be discharged. Patient to be discharged with a follow-up appointment with oral surgery ([facility]) today at 9 AM.  This appointment has been confirmed, patient was provided with a taxicab ride to the appointment. Also provided with 1 week of Augmentin and several pills of oxycodone as needed for pain control. Disposition: Home    Patient stated that they will not drive themselves home from the hospital if they have gotten pain killers/ narcotics earlier that day and that they will arrange for transportation on their own or work with the  for a ride. Patient counseled NOT to drive while under the influence of narcotics/ pain killers. Condition: Good    Patient stable and ready for discharge home. I have discussed plan of care with patient and they are in understanding. They were instructed to read discharge paperwork. All of their questions and concerns were addressed.      Time Spent: 0 day      --  Cliff Cortez MD  Emergency Medicine Resident Physician    This dictation was generated by voice recognition computer software. Although all attempts are made to edit the dictation for accuracy, there may be errors in the transcription that are not intended.

## 2020-09-12 LAB
CULTURE: NORMAL
CULTURE: NORMAL
Lab: NORMAL
Lab: NORMAL
SPECIMEN DESCRIPTION: NORMAL
SPECIMEN DESCRIPTION: NORMAL

## 2022-10-11 ENCOUNTER — TELEPHONE (OUTPATIENT)
Dept: PRIMARY CARE CLINIC | Age: 42
End: 2022-10-11

## 2022-10-11 NOTE — TELEPHONE ENCOUNTER
Patient's wife was to see you today at 2:00 for a new patient appointment. She cancelled today at 10:46 a.m. and rescheduled. Her , Vladislav Estrada was scheduled for a new patient appt on 9/19 and cancelled 3 hours prior to appt. Rescheduled for 9/27/22 and no showed. He would like to reschedule even though he late cancelled and no showed his new patient appointments. Okay to schedule him?        ----- Message from [address] sent at 10/11/2022 10:51 AM EDT -----  Subject: Appointment Request    Reason for Call: New Patient/New to Provider Appointment needed: New   Patient Request Appointment    QUESTIONS    Reason for appointment request? No appointments available during search     Additional Information for Provider? Patient is looking to establish care. Wanting to come in same day as wife on 10/25/2022.  Please advise   ---------------------------------------------------------------------------  --------------  Aaliyah Pavon INFO  [phone]; OK to leave message on voicemail  ---------------------------------------------------------------------------  --------------  SCRIPT ANSWERS  COVID Screen: Momo Zeng

## 2022-10-13 NOTE — TELEPHONE ENCOUNTER
LM that Nikolay Gutierrez will not be able to schedule patient due to no show. Question? Call office. Never

## 2023-08-13 ENCOUNTER — APPOINTMENT (OUTPATIENT)
Dept: GENERAL RADIOLOGY | Age: 43
End: 2023-08-13
Payer: MEDICAID

## 2023-08-13 ENCOUNTER — HOSPITAL ENCOUNTER (EMERGENCY)
Age: 43
Discharge: HOME | End: 2023-08-13
Attending: STUDENT IN AN ORGANIZED HEALTH CARE EDUCATION/TRAINING PROGRAM
Payer: MEDICAID

## 2023-08-13 VITALS
BODY MASS INDEX: 23.03 KG/M2 | HEART RATE: 69 BPM | WEIGHT: 170 LBS | SYSTOLIC BLOOD PRESSURE: 130 MMHG | RESPIRATION RATE: 16 BRPM | DIASTOLIC BLOOD PRESSURE: 97 MMHG | HEIGHT: 72 IN | TEMPERATURE: 98.6 F | OXYGEN SATURATION: 98 %

## 2023-08-13 DIAGNOSIS — F19.939 ACUTE DRUG WITHDRAWAL SYNDROME WITH COMPLICATION (HCC): ICD-10-CM

## 2023-08-13 DIAGNOSIS — R11.2 NAUSEA AND VOMITING, UNSPECIFIED VOMITING TYPE: Primary | ICD-10-CM

## 2023-08-13 LAB
ANION GAP SERPL CALCULATED.3IONS-SCNC: 11 MMOL/L (ref 9–17)
BASOPHILS # BLD: 0.01 K/UL (ref 0–0.2)
BASOPHILS NFR BLD: 0 % (ref 0–2)
BUN SERPL-MCNC: 9 MG/DL (ref 6–20)
CALCIUM SERPL-MCNC: 9.4 MG/DL (ref 8.6–10.4)
CHLORIDE SERPL-SCNC: 104 MMOL/L (ref 98–107)
CO2 SERPL-SCNC: 23 MMOL/L (ref 20–31)
CREAT SERPL-MCNC: 1.3 MG/DL (ref 0.7–1.2)
EOSINOPHIL # BLD: 0.04 K/UL (ref 0–0.4)
EOSINOPHILS RELATIVE PERCENT: 1 % (ref 1–4)
ERYTHROCYTE [DISTWIDTH] IN BLOOD BY AUTOMATED COUNT: 15 % (ref 12.5–15.4)
GFR SERPL CREATININE-BSD FRML MDRD: >60 ML/MIN/1.73M2
GLUCOSE SERPL-MCNC: 118 MG/DL (ref 70–99)
HCT VFR BLD AUTO: 37.1 % (ref 41–53)
HGB BLD-MCNC: 12.2 G/DL (ref 13.5–17.5)
LYMPHOCYTES NFR BLD: 0.94 K/UL (ref 1–4.8)
LYMPHOCYTES RELATIVE PERCENT: 19 % (ref 24–44)
MAGNESIUM SERPL-MCNC: 2.4 MG/DL (ref 1.6–2.6)
MCH RBC QN AUTO: 24.8 PG (ref 26–34)
MCHC RBC AUTO-ENTMCNC: 32.9 G/DL (ref 31–37)
MCV RBC AUTO: 75.6 FL (ref 80–100)
MONOCYTES NFR BLD: 0.17 K/UL (ref 0.1–1.2)
MONOCYTES NFR BLD: 4 % (ref 2–11)
NEUTROPHILS NFR BLD: 76 % (ref 36–66)
NEUTS SEG NFR BLD: 3.68 K/UL (ref 1.8–7.7)
PLATELET # BLD AUTO: 240 K/UL (ref 140–450)
PMV BLD AUTO: 12.5 FL (ref 8–14)
POTASSIUM SERPL-SCNC: 3.4 MMOL/L (ref 3.7–5.3)
RBC # BLD AUTO: 4.91 M/UL (ref 4.5–5.9)
SODIUM SERPL-SCNC: 138 MMOL/L (ref 135–144)
TROPONIN I SERPL HS-MCNC: 8 NG/L (ref 0–22)
WBC OTHER # BLD: 4.8 K/UL (ref 3.5–11)

## 2023-08-13 PROCEDURE — A4216 STERILE WATER/SALINE, 10 ML: HCPCS | Performed by: STUDENT IN AN ORGANIZED HEALTH CARE EDUCATION/TRAINING PROGRAM

## 2023-08-13 PROCEDURE — 36415 COLL VENOUS BLD VENIPUNCTURE: CPT

## 2023-08-13 PROCEDURE — 93005 ELECTROCARDIOGRAM TRACING: CPT

## 2023-08-13 PROCEDURE — 99285 EMERGENCY DEPT VISIT HI MDM: CPT | Performed by: STUDENT IN AN ORGANIZED HEALTH CARE EDUCATION/TRAINING PROGRAM

## 2023-08-13 PROCEDURE — 83735 ASSAY OF MAGNESIUM: CPT

## 2023-08-13 PROCEDURE — 84484 ASSAY OF TROPONIN QUANT: CPT

## 2023-08-13 PROCEDURE — 6360000002 HC RX W HCPCS: Performed by: STUDENT IN AN ORGANIZED HEALTH CARE EDUCATION/TRAINING PROGRAM

## 2023-08-13 PROCEDURE — 96375 TX/PRO/DX INJ NEW DRUG ADDON: CPT | Performed by: STUDENT IN AN ORGANIZED HEALTH CARE EDUCATION/TRAINING PROGRAM

## 2023-08-13 PROCEDURE — 85025 COMPLETE CBC W/AUTO DIFF WBC: CPT

## 2023-08-13 PROCEDURE — 96372 THER/PROPH/DIAG INJ SC/IM: CPT | Performed by: STUDENT IN AN ORGANIZED HEALTH CARE EDUCATION/TRAINING PROGRAM

## 2023-08-13 PROCEDURE — 80048 BASIC METABOLIC PNL TOTAL CA: CPT

## 2023-08-13 PROCEDURE — 71046 X-RAY EXAM CHEST 2 VIEWS: CPT

## 2023-08-13 PROCEDURE — 2500000003 HC RX 250 WO HCPCS: Performed by: STUDENT IN AN ORGANIZED HEALTH CARE EDUCATION/TRAINING PROGRAM

## 2023-08-13 PROCEDURE — 96374 THER/PROPH/DIAG INJ IV PUSH: CPT | Performed by: STUDENT IN AN ORGANIZED HEALTH CARE EDUCATION/TRAINING PROGRAM

## 2023-08-13 PROCEDURE — 2580000003 HC RX 258: Performed by: STUDENT IN AN ORGANIZED HEALTH CARE EDUCATION/TRAINING PROGRAM

## 2023-08-13 RX ORDER — 0.9 % SODIUM CHLORIDE 0.9 %
1000 INTRAVENOUS SOLUTION INTRAVENOUS ONCE
Status: COMPLETED | OUTPATIENT
Start: 2023-08-13 | End: 2023-08-13

## 2023-08-13 RX ORDER — KETOROLAC TROMETHAMINE 30 MG/ML
30 INJECTION, SOLUTION INTRAMUSCULAR; INTRAVENOUS ONCE
Status: COMPLETED | OUTPATIENT
Start: 2023-08-13 | End: 2023-08-13

## 2023-08-13 RX ORDER — HALOPERIDOL 5 MG/ML
5 INJECTION INTRAMUSCULAR ONCE
Status: COMPLETED | OUTPATIENT
Start: 2023-08-13 | End: 2023-08-13

## 2023-08-13 RX ORDER — DIPHENHYDRAMINE HYDROCHLORIDE 50 MG/ML
50 INJECTION INTRAMUSCULAR; INTRAVENOUS ONCE
Status: COMPLETED | OUTPATIENT
Start: 2023-08-13 | End: 2023-08-13

## 2023-08-13 RX ORDER — KETOROLAC TROMETHAMINE 30 MG/ML
30 INJECTION, SOLUTION INTRAMUSCULAR; INTRAVENOUS ONCE
Status: DISCONTINUED | OUTPATIENT
Start: 2023-08-13 | End: 2023-08-13

## 2023-08-13 RX ORDER — PROCHLORPERAZINE EDISYLATE 5 MG/ML
10 INJECTION INTRAMUSCULAR; INTRAVENOUS ONCE
Status: COMPLETED | OUTPATIENT
Start: 2023-08-13 | End: 2023-08-13

## 2023-08-13 RX ADMIN — DIPHENHYDRAMINE HYDROCHLORIDE 50 MG: 50 INJECTION, SOLUTION INTRAMUSCULAR; INTRAVENOUS at 02:49

## 2023-08-13 RX ADMIN — HALOPERIDOL LACTATE 5 MG: 5 INJECTION, SOLUTION INTRAMUSCULAR at 07:42

## 2023-08-13 RX ADMIN — KETOROLAC TROMETHAMINE 30 MG: 30 INJECTION, SOLUTION INTRAMUSCULAR; INTRAVENOUS at 02:49

## 2023-08-13 RX ADMIN — FAMOTIDINE 20 MG: 10 INJECTION, SOLUTION INTRAVENOUS at 02:49

## 2023-08-13 RX ADMIN — PROCHLORPERAZINE EDISYLATE 10 MG: 5 INJECTION INTRAMUSCULAR; INTRAVENOUS at 02:49

## 2023-08-13 RX ADMIN — SODIUM CHLORIDE 1000 ML: 9 INJECTION, SOLUTION INTRAVENOUS at 02:48

## 2023-08-13 ASSESSMENT — PAIN - FUNCTIONAL ASSESSMENT: PAIN_FUNCTIONAL_ASSESSMENT: 0-10

## 2023-08-13 ASSESSMENT — PAIN SCALES - GENERAL: PAINLEVEL_OUTOF10: 10

## 2023-08-13 ASSESSMENT — PAIN DESCRIPTION - LOCATION: LOCATION: ABDOMEN;CHEST

## 2023-08-13 NOTE — DISCHARGE INSTRUCTIONS
SUMMARY OF YOUR VISIT    Today you were seen for nausea and vomiting while going through detox/withdrawals. We discussed your labs. We provided you with multiple medications as well as IV fluids to control your nausea and vomiting. I do recommend that you follow-up with your primary care provider, I have provided you with information to follow-up with [address] [place] [address] if you do not have a primary care provider. You return to the detox facility for further management of your detox. Please continue to take your home medication as previously prescribed, I have made no changes to your home medications. You can return to our or another Emergency Department as needed or for worsening symptoms of chest pain, shortness of breath, high fevers not relieved by acetaminophen (Tylenol) and/or ibuprofen (Motrin / Advil), chills, feeling of your heart fluttering or racing, persistent nausea and/or vomiting, vomiting up blood, blood in your stool, loss of consciousness, numbness, weakness or tingling in the arms or legs or change in color of the extremities, changes in mental status, persistent headache, blurry vision, loss of bladder / bowel control, if you are unable to follow up with your physician, or other any other care or concern. Thank You! On behalf of the Emergency Department staff and team, I would like to thank you for allowing us the opportunity to participate in your health care and evaluation today.

## 2023-08-13 NOTE — ED NOTES
Pt found walking halls, walking into other patient room, looking for staff to ask for additional meds, MD updated      Josette Hayden RN  08/13/23 [id number]

## 2023-08-14 LAB
EKG ATRIAL RATE: 77 BPM
EKG P AXIS: 46 DEGREES
EKG P-R INTERVAL: 192 MS
EKG Q-T INTERVAL: 414 MS
EKG QRS DURATION: 92 MS
EKG QTC CALCULATION (BAZETT): 468 MS
EKG R AXIS: 11 DEGREES
EKG T AXIS: 49 DEGREES
EKG VENTRICULAR RATE: 77 BPM

## 2025-05-06 ENCOUNTER — HOSPITAL ENCOUNTER (EMERGENCY)
Age: 45
Discharge: HOME | End: 2025-05-06
Attending: EMERGENCY MEDICINE
Payer: MEDICAID

## 2025-05-06 VITALS
RESPIRATION RATE: 26 BRPM | OXYGEN SATURATION: 98 % | SYSTOLIC BLOOD PRESSURE: 158 MMHG | BODY MASS INDEX: 24.11 KG/M2 | WEIGHT: 178 LBS | TEMPERATURE: 100 F | DIASTOLIC BLOOD PRESSURE: 100 MMHG | HEIGHT: 72 IN | HEART RATE: 64 BPM

## 2025-05-06 DIAGNOSIS — F11.93 OPIOID WITHDRAWAL (HCC): Primary | ICD-10-CM

## 2025-05-06 LAB
ALBUMIN SERPL-MCNC: 4.4 G/DL (ref 3.5–5.2)
ALBUMIN/GLOB SERPL: 1.4 {RATIO} (ref 1–2.5)
ALP SERPL-CCNC: 101 U/L (ref 40–129)
ALT SERPL-CCNC: 27 U/L (ref 10–50)
ANION GAP SERPL CALCULATED.3IONS-SCNC: 15 MMOL/L (ref 9–16)
AST SERPL-CCNC: 23 U/L (ref 10–50)
BASOPHILS # BLD: 0 K/UL (ref 0–0.2)
BASOPHILS NFR BLD: 0 %
BILIRUB SERPL-MCNC: 0.6 MG/DL (ref 0–1.2)
BUN SERPL-MCNC: 9 MG/DL (ref 6–20)
CALCIUM SERPL-MCNC: 9.4 MG/DL (ref 8.6–10.4)
CHLORIDE SERPL-SCNC: 100 MMOL/L (ref 98–107)
CO2 SERPL-SCNC: 22 MMOL/L (ref 20–31)
CREAT SERPL-MCNC: 1.2 MG/DL (ref 0.7–1.2)
EKG ATRIAL RATE: 56 BPM
EKG P AXIS: 57 DEGREES
EKG P-R INTERVAL: 184 MS
EKG Q-T INTERVAL: 436 MS
EKG QRS DURATION: 88 MS
EKG QTC CALCULATION (BAZETT): 420 MS
EKG R AXIS: 7 DEGREES
EKG T AXIS: 56 DEGREES
EKG VENTRICULAR RATE: 56 BPM
EOSINOPHIL # BLD: 0 K/UL (ref 0–0.4)
EOSINOPHILS RELATIVE PERCENT: 0 % (ref 1–4)
ERYTHROCYTE [DISTWIDTH] IN BLOOD BY AUTOMATED COUNT: 14.3 % (ref 11.8–14.4)
GFR, ESTIMATED: 79 ML/MIN/1.73M2
GLUCOSE SERPL-MCNC: 118 MG/DL (ref 74–99)
HCT VFR BLD AUTO: 36.8 % (ref 40.7–50.3)
HGB BLD-MCNC: 12.4 G/DL (ref 13–17)
IMM GRANULOCYTES # BLD AUTO: 0 K/UL (ref 0–0.3)
IMM GRANULOCYTES NFR BLD: 0 %
LYMPHOCYTES NFR BLD: 0.66 K/UL (ref 1–4.8)
LYMPHOCYTES RELATIVE PERCENT: 13 % (ref 24–44)
MCH RBC QN AUTO: 26.2 PG (ref 25.2–33.5)
MCHC RBC AUTO-ENTMCNC: 33.7 G/DL (ref 28.4–34.8)
MCV RBC AUTO: 77.8 FL (ref 82.6–102.9)
MONOCYTES NFR BLD: 0.1 K/UL (ref 0.2–0.8)
MONOCYTES NFR BLD: 2 % (ref 1–7)
MORPHOLOGY: ABNORMAL
NEUTROPHILS NFR BLD: 85 % (ref 36–66)
NEUTS SEG NFR BLD: 4.34 K/UL (ref 1.8–7.7)
NRBC BLD-RTO: 0 PER 100 WBC
PLATELET # BLD AUTO: 256 K/UL (ref 138–453)
PMV BLD AUTO: 11.8 FL (ref 8.1–13.5)
POTASSIUM SERPL-SCNC: 3.9 MMOL/L (ref 3.7–5.3)
PROT SERPL-MCNC: 7.5 G/DL (ref 6.6–8.7)
RBC # BLD AUTO: 4.73 M/UL (ref 4.21–5.77)
SODIUM SERPL-SCNC: 137 MMOL/L (ref 136–145)
WBC OTHER # BLD: 5.1 K/UL (ref 3.5–11.3)

## 2025-05-06 PROCEDURE — 6370000000 HC RX 637 (ALT 250 FOR IP): Performed by: EMERGENCY MEDICINE

## 2025-05-06 PROCEDURE — 96372 THER/PROPH/DIAG INJ SC/IM: CPT

## 2025-05-06 PROCEDURE — 93005 ELECTROCARDIOGRAM TRACING: CPT | Performed by: EMERGENCY MEDICINE

## 2025-05-06 PROCEDURE — 93010 ELECTROCARDIOGRAM REPORT: CPT | Performed by: INTERNAL MEDICINE

## 2025-05-06 PROCEDURE — 6360000002 HC RX W HCPCS: Performed by: EMERGENCY MEDICINE

## 2025-05-06 PROCEDURE — 99284 EMERGENCY DEPT VISIT MOD MDM: CPT

## 2025-05-06 PROCEDURE — 96375 TX/PRO/DX INJ NEW DRUG ADDON: CPT

## 2025-05-06 PROCEDURE — 85025 COMPLETE CBC W/AUTO DIFF WBC: CPT

## 2025-05-06 PROCEDURE — 80053 COMPREHEN METABOLIC PANEL: CPT

## 2025-05-06 PROCEDURE — 96374 THER/PROPH/DIAG INJ IV PUSH: CPT

## 2025-05-06 RX ORDER — KETOROLAC TROMETHAMINE 30 MG/ML
30 INJECTION, SOLUTION INTRAMUSCULAR; INTRAVENOUS ONCE
Status: COMPLETED | OUTPATIENT
Start: 2025-05-06 | End: 2025-05-06

## 2025-05-06 RX ORDER — CLONAZEPAM 0.5 MG/1
0.5 TABLET ORAL ONCE
Status: COMPLETED | OUTPATIENT
Start: 2025-05-06 | End: 2025-05-06

## 2025-05-06 RX ORDER — ORPHENADRINE CITRATE 30 MG/ML
60 INJECTION INTRAMUSCULAR; INTRAVENOUS ONCE
Status: COMPLETED | OUTPATIENT
Start: 2025-05-06 | End: 2025-05-06

## 2025-05-06 RX ORDER — LORAZEPAM 2 MG/ML
0.5 INJECTION INTRAMUSCULAR ONCE
Status: COMPLETED | OUTPATIENT
Start: 2025-05-06 | End: 2025-05-06

## 2025-05-06 RX ADMIN — KETOROLAC TROMETHAMINE 30 MG: 30 INJECTION, SOLUTION INTRAMUSCULAR at 05:12

## 2025-05-06 RX ADMIN — LORAZEPAM 0.5 MG: 2 INJECTION INTRAMUSCULAR; INTRAVENOUS at 05:13

## 2025-05-06 RX ADMIN — ORPHENADRINE CITRATE 60 MG: 30 INJECTION, SOLUTION INTRAMUSCULAR; INTRAVENOUS at 06:50

## 2025-05-06 RX ADMIN — CLONAZEPAM 0.5 MG: 0.5 TABLET ORAL at 06:48

## 2025-05-06 ASSESSMENT — PAIN SCALES - GENERAL: PAINLEVEL_OUTOF10: 10

## 2025-05-06 ASSESSMENT — ENCOUNTER SYMPTOMS: VOMITING: 1

## 2025-05-06 NOTE — DISCHARGE INSTRUCTIONS
We discussed withdrawal from opioid medications can be quite difficult.  I encourage you to stick with the program for the next several days.  You may return the emergency room if symptoms are uncontrolled.

## 2025-05-06 NOTE — ED PROVIDER NOTES
General: He is not in acute distress.     Appearance: He is well-developed.   HENT:      Head: Normocephalic.   Eyes:      Pupils: Pupils are equal, round, and reactive to light.   Cardiovascular:      Rate and Rhythm: Normal rate and regular rhythm.      Heart sounds: Normal heart sounds.   Pulmonary:      Effort: Pulmonary effort is normal. No respiratory distress.      Breath sounds: Normal breath sounds.   Abdominal:      General: Bowel sounds are normal.      Palpations: Abdomen is soft.      Tenderness: There is no abdominal tenderness.   Musculoskeletal:         General: Normal range of motion.   Skin:     General: Skin is warm and dry.   Neurological:      Mental Status: He is alert and oriented to person, place, and time.         MEDICAL DECISION MAKING / ED COURSE:   Summary of Patient Presentation:        1)  Number and Complexity of Problems  Problem List This Visit: Vomiting chills and body pain; currently withdrawing from opioid pain medication at detox center    Differential Diagnosis: Dehydration, LIAM, withdrawal symptoms    Diagnoses Considered but Do Not Suspect: Rhabdomyolysis, LIAM    Pertinent Comorbid Conditions: Opioid abuse, currently in detox    2)  Data Reviewed  My EKG interpretation: Sinus bradycardia ventricular rate 56  No significant ST or T wave changes    QTc 420    See more data below for the lab and radiology tests and orders.    3)  Treatment and Disposition    Patient repeat assessment: Patient remained uncomfortable here in the ED.  Patient reporting whole body pain.  Patient did not have any further episodes of vomiting here.    Impression: 45-year-old male presenting to the emergency room for chills nausea vomiting and myalgias/arthralgias.  Symptoms are consistent with opioid withdrawal.  Patient has normal white count.  Patient somewhat hypertensive and has low-grade temp here in the ED-these are also consistent with withdrawal.    Disposition discussion with

## 2025-05-06 NOTE — ED NOTES
Patient arrived to ED with c/o vomiting. Patient is currently at detox with [facility] from withdrawal from opiates. Patient reported his last use was yesterday 0500. Patient reports vomiting 12 times with no relief with PO promethazine and Zofran administered from [facility] medical staff. EMS established IV and administered IV Zofran en route to ED. Patient is A&Ox4, his speech is clear and he is able to express his needs.

## 2025-05-06 NOTE — ED NOTES
Patient placed on 2 L nasal cannula for decreased oxygen saturation.     Dr. Goldberger notified.

## (undated) DEVICE — Device

## (undated) DEVICE — DRAPE,REIN 53X77,STERILE: Brand: MEDLINE

## (undated) DEVICE — GLOVE ORANGE PI 7   MSG9070

## (undated) DEVICE — BIT DRL L100MM DIA2MM QUIK CPL W/O STP REUSE

## (undated) DEVICE — SUTURE VCRL SZ 3-0 L18IN ABSRB UD PS-2 L19MM 1/2 CIR J497G

## (undated) DEVICE — SUTURE MCRYL SZ 3-0 L18IN ABSRB UD L19MM PS-2 3/8 CIR PRIM Y497G

## (undated) DEVICE — PADDING,UNDERCAST,COTTON, 4"X4YD STERILE: Brand: MEDLINE

## (undated) DEVICE — TOURNIQUET CUF BLD PRESSURE 4X18 IN 2 PRT SINGLE BLDR RED

## (undated) DEVICE — ORTHO EXT PK

## (undated) DEVICE — CHLORAPREP 26ML ORANGE

## (undated) DEVICE — STOCKINETTE,DOUBLE PLY,4X48,STERILE: Brand: MEDLINE

## (undated) DEVICE — SKIN AFFIX SURG ADHESIVE 72/CS 0.55ML: Brand: MEDLINE

## (undated) DEVICE — DRAPE C ARM UNIV W41XL74IN CLR PLAS XR VELC CLSR POLY STRP

## (undated) DEVICE — GLOVE ORANGE PI 8   MSG9080

## (undated) DEVICE — GARMENT,MEDLINE,DVT,INT,CALF,MED, GEN2: Brand: MEDLINE

## (undated) DEVICE — GLOVE ORANGE PI 8 1/2   MSG9085

## (undated) DEVICE — INTENDED FOR TISSUE SEPARATION, AND OTHER PROCEDURES THAT REQUIRE A SHARP SURGICAL BLADE TO PUNCTURE OR CUT.: Brand: BARD-PARKER ® CARBON RIB-BACK BLADES

## (undated) DEVICE — GOWN,AURORA,NONRNF,XL,30/CS: Brand: MEDLINE

## (undated) DEVICE — SUTURE VCRL SZ 3-0 L27IN ABSRB UD L26MM SH 1/2 CIR J416H

## (undated) DEVICE — GLOVE ORANGE PI 7 1/2   MSG9075

## (undated) DEVICE — BIT DRL L110MM DIA1.8MM QUIK CPL CALIB W/O STP REUSE